# Patient Record
Sex: FEMALE | Race: OTHER | NOT HISPANIC OR LATINO
[De-identification: names, ages, dates, MRNs, and addresses within clinical notes are randomized per-mention and may not be internally consistent; named-entity substitution may affect disease eponyms.]

---

## 2019-04-18 ENCOUNTER — APPOINTMENT (OUTPATIENT)
Dept: OTOLARYNGOLOGY | Facility: CLINIC | Age: 74
End: 2019-04-18
Payer: MEDICARE

## 2019-04-18 ENCOUNTER — RESULT CHARGE (OUTPATIENT)
Age: 74
End: 2019-04-18

## 2019-04-18 VITALS — BODY MASS INDEX: 27.31 KG/M2 | WEIGHT: 160 LBS | HEIGHT: 64 IN

## 2019-04-18 DIAGNOSIS — H90.3 SENSORINEURAL HEARING LOSS, BILATERAL: ICD-10-CM

## 2019-04-18 DIAGNOSIS — Z78.9 OTHER SPECIFIED HEALTH STATUS: ICD-10-CM

## 2019-04-18 DIAGNOSIS — H90.5 UNSPECIFIED SENSORINEURAL HEARING LOSS: ICD-10-CM

## 2019-04-18 DIAGNOSIS — H61.20 IMPACTED CERUMEN, UNSPECIFIED EAR: ICD-10-CM

## 2019-04-18 DIAGNOSIS — Z86.2 PERSONAL HISTORY OF DISEASES OF THE BLOOD AND BLOOD-FORMING ORGANS AND CERTAIN DISORDERS INVOLVING THE IMMUNE MECHANISM: ICD-10-CM

## 2019-04-18 PROCEDURE — 69210 REMOVE IMPACTED EAR WAX UNI: CPT

## 2019-04-18 PROCEDURE — 92557 COMPREHENSIVE HEARING TEST: CPT

## 2019-04-18 PROCEDURE — 92567 TYMPANOMETRY: CPT

## 2019-04-18 PROCEDURE — 99203 OFFICE O/P NEW LOW 30 MIN: CPT | Mod: 25

## 2019-04-18 NOTE — PHYSICAL EXAM
[Midline] : trachea located in midline position [Normal] : orientation to person, place, and time: normal [FreeTextEntry1] : Microscopic ear exam with cerumen debridement:\par \par Right ear: Obstructing cerumen was debrided from the ear canal using suction, and curet.  The ear canal was otherwise within normal limits.  The tympanic membrane was intact and noninflamed.\par \par Left ear: Obstructing cerumen was debrided from the ear canal using suction, and curets.  The ear canal was otherwise within normal limits.  The tympanic membrane was intact and noninflamed.\par

## 2019-04-18 NOTE — HISTORY OF PRESENT ILLNESS
[de-identified] : GUERRERO ARAUJO has a history of recurrent right ear congestion and eczema.  No ear pain or otorrhea. Using a cotton swab to dry the ear occasionally. Some hearing loss reported.

## 2019-04-18 NOTE — REASON FOR VISIT
[Subsequent Evaluation] : a subsequent evaluation for [Hearing Loss] : hearing loss [FreeTextEntry2] : Ear Wax Cleaning

## 2019-04-18 NOTE — ASSESSMENT
[FreeTextEntry1] : Ear hygiene reviewed in detail.  Follow up recommended if symptoms persist or progresses.  Routine follow up for cerumen management suggested.\par Clinical monitoring recommended.

## 2019-04-19 PROBLEM — H90.3 SENSORINEURAL HEARING LOSS (SNHL) OF BOTH EARS: Status: ACTIVE | Noted: 2019-04-19

## 2019-05-02 ENCOUNTER — APPOINTMENT (OUTPATIENT)
Dept: OTOLARYNGOLOGY | Facility: CLINIC | Age: 74
End: 2019-05-02

## 2019-10-20 ENCOUNTER — INPATIENT (INPATIENT)
Facility: HOSPITAL | Age: 74
LOS: 0 days | Discharge: LEFT BEFORE DISCHARGE | DRG: 305 | End: 2019-10-21
Attending: INTERNAL MEDICINE | Admitting: INTERNAL MEDICINE
Payer: MEDICARE

## 2019-10-20 VITALS
WEIGHT: 160.06 LBS | OXYGEN SATURATION: 97 % | RESPIRATION RATE: 24 BRPM | TEMPERATURE: 97 F | SYSTOLIC BLOOD PRESSURE: 227 MMHG | DIASTOLIC BLOOD PRESSURE: 97 MMHG | HEART RATE: 63 BPM

## 2019-10-20 LAB
ALBUMIN SERPL ELPH-MCNC: 4.7 G/DL — SIGNIFICANT CHANGE UP (ref 3.3–5)
ALP SERPL-CCNC: 77 U/L — SIGNIFICANT CHANGE UP (ref 40–120)
ALT FLD-CCNC: 20 U/L — SIGNIFICANT CHANGE UP (ref 10–45)
ANION GAP SERPL CALC-SCNC: 17 MMOL/L — SIGNIFICANT CHANGE UP (ref 5–17)
APPEARANCE UR: CLEAR — SIGNIFICANT CHANGE UP
APTT BLD: 28.4 SEC — SIGNIFICANT CHANGE UP (ref 27.5–36.3)
AST SERPL-CCNC: 22 U/L — SIGNIFICANT CHANGE UP (ref 10–40)
BACTERIA # UR AUTO: PRESENT /HPF
BASOPHILS # BLD AUTO: 0.04 K/UL — SIGNIFICANT CHANGE UP (ref 0–0.2)
BASOPHILS NFR BLD AUTO: 0.2 % — SIGNIFICANT CHANGE UP (ref 0–2)
BILIRUB SERPL-MCNC: 0.6 MG/DL — SIGNIFICANT CHANGE UP (ref 0.2–1.2)
BILIRUB UR-MCNC: NEGATIVE — SIGNIFICANT CHANGE UP
BUN SERPL-MCNC: 19 MG/DL — SIGNIFICANT CHANGE UP (ref 7–23)
CALCIUM SERPL-MCNC: 9.4 MG/DL — SIGNIFICANT CHANGE UP (ref 8.4–10.5)
CHLORIDE SERPL-SCNC: 98 MMOL/L — SIGNIFICANT CHANGE UP (ref 96–108)
CK MB CFR SERPL CALC: 2.5 NG/ML — SIGNIFICANT CHANGE UP (ref 0–6.7)
CK SERPL-CCNC: 92 U/L — SIGNIFICANT CHANGE UP (ref 25–170)
CO2 SERPL-SCNC: 26 MMOL/L — SIGNIFICANT CHANGE UP (ref 22–31)
COLOR SPEC: YELLOW — SIGNIFICANT CHANGE UP
CREAT SERPL-MCNC: 0.56 MG/DL — SIGNIFICANT CHANGE UP (ref 0.5–1.3)
DIFF PNL FLD: ABNORMAL
EOSINOPHIL # BLD AUTO: 0.03 K/UL — SIGNIFICANT CHANGE UP (ref 0–0.5)
EOSINOPHIL NFR BLD AUTO: 0.2 % — SIGNIFICANT CHANGE UP (ref 0–6)
EPI CELLS # UR: SIGNIFICANT CHANGE UP /HPF (ref 0–5)
EXTRA SST TUBE: SIGNIFICANT CHANGE UP
GLUCOSE SERPL-MCNC: 282 MG/DL — HIGH (ref 70–99)
GLUCOSE UR QL: 250
HCT VFR BLD CALC: 45.5 % — HIGH (ref 34.5–45)
HGB BLD-MCNC: 14.9 G/DL — SIGNIFICANT CHANGE UP (ref 11.5–15.5)
IMM GRANULOCYTES NFR BLD AUTO: 0.9 % — SIGNIFICANT CHANGE UP (ref 0–1.5)
INR BLD: 1.02 — SIGNIFICANT CHANGE UP (ref 0.88–1.16)
KETONES UR-MCNC: 15 MG/DL
LEUKOCYTE ESTERASE UR-ACNC: NEGATIVE — SIGNIFICANT CHANGE UP
LIDOCAIN IGE QN: 16 U/L — SIGNIFICANT CHANGE UP (ref 7–60)
LYMPHOCYTES # BLD AUTO: 12.7 % — LOW (ref 13–44)
LYMPHOCYTES # BLD AUTO: 2.15 K/UL — SIGNIFICANT CHANGE UP (ref 1–3.3)
MCHC RBC-ENTMCNC: 30.3 PG — SIGNIFICANT CHANGE UP (ref 27–34)
MCHC RBC-ENTMCNC: 32.7 GM/DL — SIGNIFICANT CHANGE UP (ref 32–36)
MCV RBC AUTO: 92.7 FL — SIGNIFICANT CHANGE UP (ref 80–100)
MONOCYTES # BLD AUTO: 0.58 K/UL — SIGNIFICANT CHANGE UP (ref 0–0.9)
MONOCYTES NFR BLD AUTO: 3.4 % — SIGNIFICANT CHANGE UP (ref 2–14)
NEUTROPHILS # BLD AUTO: 14.02 K/UL — HIGH (ref 1.8–7.4)
NEUTROPHILS NFR BLD AUTO: 82.6 % — HIGH (ref 43–77)
NITRITE UR-MCNC: NEGATIVE — SIGNIFICANT CHANGE UP
NRBC # BLD: 0 /100 WBCS — SIGNIFICANT CHANGE UP (ref 0–0)
PH UR: 7 — SIGNIFICANT CHANGE UP (ref 5–8)
PLATELET # BLD AUTO: 227 K/UL — SIGNIFICANT CHANGE UP (ref 150–400)
POTASSIUM SERPL-MCNC: 3.3 MMOL/L — LOW (ref 3.5–5.3)
POTASSIUM SERPL-SCNC: 3.3 MMOL/L — LOW (ref 3.5–5.3)
PROT SERPL-MCNC: 7.8 G/DL — SIGNIFICANT CHANGE UP (ref 6–8.3)
PROT UR-MCNC: 100 MG/DL
PROTHROM AB SERPL-ACNC: 11.5 SEC — SIGNIFICANT CHANGE UP (ref 10–12.9)
RBC # BLD: 4.91 M/UL — SIGNIFICANT CHANGE UP (ref 3.8–5.2)
RBC # FLD: 12.5 % — SIGNIFICANT CHANGE UP (ref 10.3–14.5)
RBC CASTS # UR COMP ASSIST: < 5 /HPF — SIGNIFICANT CHANGE UP
SODIUM SERPL-SCNC: 141 MMOL/L — SIGNIFICANT CHANGE UP (ref 135–145)
SP GR SPEC: 1.02 — SIGNIFICANT CHANGE UP (ref 1–1.03)
TROPONIN T SERPL-MCNC: <0.01 NG/ML — SIGNIFICANT CHANGE UP (ref 0–0.01)
UROBILINOGEN FLD QL: 0.2 E.U./DL — SIGNIFICANT CHANGE UP
WBC # BLD: 16.98 K/UL — HIGH (ref 3.8–10.5)
WBC # FLD AUTO: 16.98 K/UL — HIGH (ref 3.8–10.5)
WBC UR QL: < 5 /HPF — SIGNIFICANT CHANGE UP

## 2019-10-20 PROCEDURE — 71045 X-RAY EXAM CHEST 1 VIEW: CPT | Mod: 26

## 2019-10-20 PROCEDURE — 99292 CRITICAL CARE ADDL 30 MIN: CPT | Mod: 25

## 2019-10-20 PROCEDURE — 99291 CRITICAL CARE FIRST HOUR: CPT

## 2019-10-20 PROCEDURE — ZZZZZ: CPT

## 2019-10-20 PROCEDURE — 93010 ELECTROCARDIOGRAM REPORT: CPT

## 2019-10-20 RX ORDER — FAMOTIDINE 10 MG/ML
20 INJECTION INTRAVENOUS ONCE
Refills: 0 | Status: COMPLETED | OUTPATIENT
Start: 2019-10-20 | End: 2019-10-20

## 2019-10-20 RX ORDER — DILTIAZEM HCL 120 MG
60 CAPSULE, EXT RELEASE 24 HR ORAL ONCE
Refills: 0 | Status: COMPLETED | OUTPATIENT
Start: 2019-10-20 | End: 2019-10-20

## 2019-10-20 RX ORDER — POTASSIUM CHLORIDE 20 MEQ
40 PACKET (EA) ORAL ONCE
Refills: 0 | Status: COMPLETED | OUTPATIENT
Start: 2019-10-20 | End: 2019-10-20

## 2019-10-20 RX ORDER — HYDRALAZINE HCL 50 MG
5 TABLET ORAL ONCE
Refills: 0 | Status: COMPLETED | OUTPATIENT
Start: 2019-10-20 | End: 2019-10-20

## 2019-10-20 RX ORDER — ONDANSETRON 8 MG/1
4 TABLET, FILM COATED ORAL ONCE
Refills: 0 | Status: COMPLETED | OUTPATIENT
Start: 2019-10-20 | End: 2019-10-20

## 2019-10-20 RX ORDER — HYDRALAZINE HCL 50 MG
10 TABLET ORAL ONCE
Refills: 0 | Status: COMPLETED | OUTPATIENT
Start: 2019-10-20 | End: 2019-10-20

## 2019-10-20 RX ORDER — LABETALOL HCL 100 MG
10 TABLET ORAL ONCE
Refills: 0 | Status: COMPLETED | OUTPATIENT
Start: 2019-10-20 | End: 2019-10-20

## 2019-10-20 RX ADMIN — Medication 40 MILLIEQUIVALENT(S): at 23:44

## 2019-10-20 RX ADMIN — ONDANSETRON 4 MILLIGRAM(S): 8 TABLET, FILM COATED ORAL at 19:48

## 2019-10-20 RX ADMIN — Medication 10 MILLIGRAM(S): at 18:32

## 2019-10-20 RX ADMIN — Medication 10 MILLIGRAM(S): at 20:58

## 2019-10-20 RX ADMIN — FAMOTIDINE 20 MILLIGRAM(S): 10 INJECTION INTRAVENOUS at 18:32

## 2019-10-20 RX ADMIN — Medication 5 MILLIGRAM(S): at 20:20

## 2019-10-20 RX ADMIN — Medication 5 MILLIGRAM(S): at 19:48

## 2019-10-20 RX ADMIN — ONDANSETRON 4 MILLIGRAM(S): 8 TABLET, FILM COATED ORAL at 18:32

## 2019-10-20 RX ADMIN — Medication 60 MILLIGRAM(S): at 19:46

## 2019-10-20 NOTE — ED ADULT NURSE NOTE - CHIEF COMPLAINT QUOTE
Patient c/o nausea and weakness since 230pm today while on the train. She states "The train was so hot it made me nauseous."

## 2019-10-20 NOTE — ED PROVIDER NOTE - DIAGNOSTIC INTERPRETATION
CXR: no focal consolidation, elevated right hemidiaphragm, normal bones, normal cardiac contour.  read by Dr. Mendez

## 2019-10-20 NOTE — ED PROVIDER NOTE - OBJECTIVE STATEMENT
here with nausea.  Was on the q train about 3 hours ago when symptoms started.  Felt similar to motion sickness she gets when flying on airplane.  Associated with feeling hot.  Denies headache, chest pain, sob, fever, abdominal pain, diarrhea.  Took half a dramamine without relief.  Still feels nauseous now.  Vomited a small amount.  Symptoms constant.

## 2019-10-20 NOTE — ED PROVIDER NOTE - PROGRESS NOTE DETAILS
bp remains elevated after labetalol 10mg.  nausea improved but still present.  hr now upper 50's so will give dose of hydralazine.  also po dose of diltiazem which she normally takes in evening. bp still elevated after hydralazine 5.  will redose bp still elevated after 5 hydralazine.  redosed at 10mg and now improved.  discussed with cards fellow.  will admit to tele

## 2019-10-20 NOTE — ED PROVIDER NOTE - CLINICAL SUMMARY MEDICAL DECISION MAKING FREE TEXT BOX
hypertensive emergency with ecg changes suggestive of inferior ischemia.  no chest pain or sob but having nausea.  see progress notes notes for frequent reassessments/ treatment of elevated bp with labetalol then hydralazine iv.  also given po dose of normal night cardizem.  labs with leukocytosis but no signs of infection/ fever.  no cough, dysuria, abdominal pain.  will observe but hold antibiotic pending further symptoms.  admit to tele for management of hypertensive emergency.

## 2019-10-20 NOTE — ED PROVIDER NOTE - CRITICAL CARE INDICATION, MLM
patient was critically ill... Patient was critically ill with a high probability of imminent or life threatening deterioration.  hypertensive emergency with ecg changes requiring frequent reassessment/ iv bp control..

## 2019-10-20 NOTE — ED PROVIDER NOTE - CHIEF COMPLAINT
She is a very pleasant woman here for assessment of her left ear. Last week she developed acute swelling and erythema of the tragus. She stopped utilizing the hearing aid in that ear and notes that her symptoms now have been more or less completely resolved for the past several days. She is not complaining of any ear drainage. She felt that the swelling was external to the tragus. Placed on antibiotics or drops. She is doing quite well. She has not had similar problems in the past.    Physical examination:  On physical examination she is very pleasant elderly woman in no distress. Facial nerve function was normal and symmetrical. On visual inspection the face was symmetric without scarring or lesions. The nose externally was normal. Intranasally unremarkable. Mucosa appeared healthy. The lips and gums were normal the tongue and soft palate mobility were normal. The posterior and lateral pharyngeal walls were normal. The auricles were normal and symmetrical. The right ear canal was normal as was the tympanic membrane. There was no perforation or effusion. On the left side there was no evidence of external otitis. The auricles normal as was the tragus. No lesions were visible. There is no induration, ulceration, swelling, or cutaneous irregularities. The external auditory canal was normal as was the tympanic membrane. There was no perforation or effusion.    Impression:  Resolved left external otitis likely brought on by use of her hearing aid.    Recommendation:  I have reassured her that everything appears healthy. I will see her back at this point on an as-needed basis.   The patient is a 73y Female complaining of nausea.

## 2019-10-20 NOTE — ED ADULT TRIAGE NOTE - OTHER COMPLAINTS
Patient hypertensive on arrival. She states she did not take her atenolol today. She denies dizziness, numbness, chest pain, sob, headache or blurry vision

## 2019-10-21 ENCOUNTER — TRANSCRIPTION ENCOUNTER (OUTPATIENT)
Age: 74
End: 2019-10-21

## 2019-10-21 ENCOUNTER — INBOUND DOCUMENT (OUTPATIENT)
Age: 74
End: 2019-10-21

## 2019-10-21 VITALS — TEMPERATURE: 100 F

## 2019-10-21 DIAGNOSIS — Z98.49 CATARACT EXTRACTION STATUS, UNSPECIFIED EYE: Chronic | ICD-10-CM

## 2019-10-21 DIAGNOSIS — I16.1 HYPERTENSIVE EMERGENCY: ICD-10-CM

## 2019-10-21 DIAGNOSIS — Z90.710 ACQUIRED ABSENCE OF BOTH CERVIX AND UTERUS: Chronic | ICD-10-CM

## 2019-10-21 DIAGNOSIS — E11.9 TYPE 2 DIABETES MELLITUS WITHOUT COMPLICATIONS: ICD-10-CM

## 2019-10-21 LAB
ALBUMIN SERPL ELPH-MCNC: 4.3 G/DL — SIGNIFICANT CHANGE UP (ref 3.3–5)
ALP SERPL-CCNC: 67 U/L — SIGNIFICANT CHANGE UP (ref 40–120)
ALT FLD-CCNC: 18 U/L — SIGNIFICANT CHANGE UP (ref 10–45)
ANION GAP SERPL CALC-SCNC: 14 MMOL/L — SIGNIFICANT CHANGE UP (ref 5–17)
APPEARANCE UR: CLEAR — SIGNIFICANT CHANGE UP
APTT BLD: 29.4 SEC — SIGNIFICANT CHANGE UP (ref 27.5–36.3)
AST SERPL-CCNC: 17 U/L — SIGNIFICANT CHANGE UP (ref 10–40)
BASOPHILS # BLD AUTO: 0.02 K/UL — SIGNIFICANT CHANGE UP (ref 0–0.2)
BASOPHILS NFR BLD AUTO: 0.2 % — SIGNIFICANT CHANGE UP (ref 0–2)
BILIRUB SERPL-MCNC: 0.7 MG/DL — SIGNIFICANT CHANGE UP (ref 0.2–1.2)
BILIRUB UR-MCNC: NEGATIVE — SIGNIFICANT CHANGE UP
BUN SERPL-MCNC: 21 MG/DL — SIGNIFICANT CHANGE UP (ref 7–23)
CALCIUM SERPL-MCNC: 9.4 MG/DL — SIGNIFICANT CHANGE UP (ref 8.4–10.5)
CHLORIDE SERPL-SCNC: 99 MMOL/L — SIGNIFICANT CHANGE UP (ref 96–108)
CHOLEST SERPL-MCNC: 190 MG/DL — SIGNIFICANT CHANGE UP (ref 10–199)
CK MB CFR SERPL CALC: 2.3 NG/ML — SIGNIFICANT CHANGE UP (ref 0–6.7)
CK SERPL-CCNC: 80 U/L — SIGNIFICANT CHANGE UP (ref 25–170)
CO2 SERPL-SCNC: 27 MMOL/L — SIGNIFICANT CHANGE UP (ref 22–31)
COLOR SPEC: YELLOW — SIGNIFICANT CHANGE UP
CREAT SERPL-MCNC: 0.73 MG/DL — SIGNIFICANT CHANGE UP (ref 0.5–1.3)
DIFF PNL FLD: ABNORMAL
EOSINOPHIL # BLD AUTO: 0 K/UL — SIGNIFICANT CHANGE UP (ref 0–0.5)
EOSINOPHIL NFR BLD AUTO: 0 % — SIGNIFICANT CHANGE UP (ref 0–6)
GLUCOSE BLDC GLUCOMTR-MCNC: 103 MG/DL — HIGH (ref 70–99)
GLUCOSE BLDC GLUCOMTR-MCNC: 136 MG/DL — HIGH (ref 70–99)
GLUCOSE SERPL-MCNC: 139 MG/DL — HIGH (ref 70–99)
GLUCOSE UR QL: 250
HBA1C BLD-MCNC: 6.2 % — HIGH (ref 4–5.6)
HCT VFR BLD CALC: 42.3 % — SIGNIFICANT CHANGE UP (ref 34.5–45)
HDLC SERPL-MCNC: 66 MG/DL — SIGNIFICANT CHANGE UP
HGB BLD-MCNC: 14.1 G/DL — SIGNIFICANT CHANGE UP (ref 11.5–15.5)
IMM GRANULOCYTES NFR BLD AUTO: 0.5 % — SIGNIFICANT CHANGE UP (ref 0–1.5)
INR BLD: 1.07 — SIGNIFICANT CHANGE UP (ref 0.88–1.16)
KETONES UR-MCNC: NEGATIVE — SIGNIFICANT CHANGE UP
LEUKOCYTE ESTERASE UR-ACNC: NEGATIVE — SIGNIFICANT CHANGE UP
LIPID PNL WITH DIRECT LDL SERPL: 106 MG/DL — HIGH
LYMPHOCYTES # BLD AUTO: 1.47 K/UL — SIGNIFICANT CHANGE UP (ref 1–3.3)
LYMPHOCYTES # BLD AUTO: 11.3 % — LOW (ref 13–44)
MAGNESIUM SERPL-MCNC: 2.4 MG/DL — SIGNIFICANT CHANGE UP (ref 1.6–2.6)
MCHC RBC-ENTMCNC: 30.8 PG — SIGNIFICANT CHANGE UP (ref 27–34)
MCHC RBC-ENTMCNC: 33.3 GM/DL — SIGNIFICANT CHANGE UP (ref 32–36)
MCV RBC AUTO: 92.4 FL — SIGNIFICANT CHANGE UP (ref 80–100)
MONOCYTES # BLD AUTO: 0.64 K/UL — SIGNIFICANT CHANGE UP (ref 0–0.9)
MONOCYTES NFR BLD AUTO: 4.9 % — SIGNIFICANT CHANGE UP (ref 2–14)
NEUTROPHILS # BLD AUTO: 10.84 K/UL — HIGH (ref 1.8–7.4)
NEUTROPHILS NFR BLD AUTO: 83.1 % — HIGH (ref 43–77)
NITRITE UR-MCNC: NEGATIVE — SIGNIFICANT CHANGE UP
NRBC # BLD: 0 /100 WBCS — SIGNIFICANT CHANGE UP (ref 0–0)
PH UR: 7 — SIGNIFICANT CHANGE UP (ref 5–8)
PLATELET # BLD AUTO: 226 K/UL — SIGNIFICANT CHANGE UP (ref 150–400)
POTASSIUM SERPL-MCNC: 4.9 MMOL/L — SIGNIFICANT CHANGE UP (ref 3.5–5.3)
POTASSIUM SERPL-SCNC: 4.9 MMOL/L — SIGNIFICANT CHANGE UP (ref 3.5–5.3)
PROT SERPL-MCNC: 7.3 G/DL — SIGNIFICANT CHANGE UP (ref 6–8.3)
PROT UR-MCNC: 30 MG/DL
PROTHROM AB SERPL-ACNC: 12.1 SEC — SIGNIFICANT CHANGE UP (ref 10–12.9)
RBC # BLD: 4.58 M/UL — SIGNIFICANT CHANGE UP (ref 3.8–5.2)
RBC # FLD: 13.1 % — SIGNIFICANT CHANGE UP (ref 10.3–14.5)
SODIUM SERPL-SCNC: 140 MMOL/L — SIGNIFICANT CHANGE UP (ref 135–145)
SP GR SPEC: 1.01 — SIGNIFICANT CHANGE UP (ref 1–1.03)
TOTAL CHOLESTEROL/HDL RATIO MEASUREMENT: 2.9 RATIO — LOW (ref 3.3–7.1)
TRIGL SERPL-MCNC: 92 MG/DL — SIGNIFICANT CHANGE UP (ref 10–149)
TROPONIN T SERPL-MCNC: <0.01 NG/ML — SIGNIFICANT CHANGE UP (ref 0–0.01)
UROBILINOGEN FLD QL: 0.2 E.U./DL — SIGNIFICANT CHANGE UP
WBC # BLD: 13.04 K/UL — HIGH (ref 3.8–10.5)
WBC # FLD AUTO: 13.04 K/UL — HIGH (ref 3.8–10.5)

## 2019-10-21 PROCEDURE — 93306 TTE W/DOPPLER COMPLETE: CPT | Mod: 26

## 2019-10-21 PROCEDURE — ZZZZZ: CPT

## 2019-10-21 RX ORDER — HEPARIN SODIUM 5000 [USP'U]/ML
5000 INJECTION INTRAVENOUS; SUBCUTANEOUS EVERY 8 HOURS
Refills: 0 | Status: DISCONTINUED | OUTPATIENT
Start: 2019-10-21 | End: 2019-10-21

## 2019-10-21 RX ORDER — DEXTROSE 50 % IN WATER 50 %
12.5 SYRINGE (ML) INTRAVENOUS ONCE
Refills: 0 | Status: DISCONTINUED | OUTPATIENT
Start: 2019-10-21 | End: 2019-10-21

## 2019-10-21 RX ORDER — DEXTROSE 50 % IN WATER 50 %
15 SYRINGE (ML) INTRAVENOUS ONCE
Refills: 0 | Status: DISCONTINUED | OUTPATIENT
Start: 2019-10-21 | End: 2019-10-21

## 2019-10-21 RX ORDER — DEXTROSE 50 % IN WATER 50 %
25 SYRINGE (ML) INTRAVENOUS ONCE
Refills: 0 | Status: DISCONTINUED | OUTPATIENT
Start: 2019-10-21 | End: 2019-10-21

## 2019-10-21 RX ORDER — SODIUM CHLORIDE 9 MG/ML
1000 INJECTION, SOLUTION INTRAVENOUS
Refills: 0 | Status: DISCONTINUED | OUTPATIENT
Start: 2019-10-21 | End: 2019-10-21

## 2019-10-21 RX ORDER — ISOSORBIDE DINITRATE 5 MG/1
1 TABLET ORAL
Qty: 90 | Refills: 0
Start: 2019-10-21 | End: 2019-11-19

## 2019-10-21 RX ORDER — ATENOLOL 25 MG/1
25 TABLET ORAL DAILY
Refills: 0 | Status: DISCONTINUED | OUTPATIENT
Start: 2019-10-21 | End: 2019-10-21

## 2019-10-21 RX ORDER — INFLUENZA VIRUS VACCINE 15; 15; 15; 15 UG/.5ML; UG/.5ML; UG/.5ML; UG/.5ML
0.5 SUSPENSION INTRAMUSCULAR ONCE
Refills: 0 | Status: COMPLETED | OUTPATIENT
Start: 2019-10-21 | End: 2019-10-21

## 2019-10-21 RX ORDER — INSULIN LISPRO 100/ML
VIAL (ML) SUBCUTANEOUS
Refills: 0 | Status: DISCONTINUED | OUTPATIENT
Start: 2019-10-21 | End: 2019-10-21

## 2019-10-21 RX ORDER — GLUCAGON INJECTION, SOLUTION 0.5 MG/.1ML
1 INJECTION, SOLUTION SUBCUTANEOUS ONCE
Refills: 0 | Status: DISCONTINUED | OUTPATIENT
Start: 2019-10-21 | End: 2019-10-21

## 2019-10-21 RX ORDER — DILTIAZEM HCL 120 MG
30 CAPSULE, EXT RELEASE 24 HR ORAL THREE TIMES A DAY
Refills: 0 | Status: DISCONTINUED | OUTPATIENT
Start: 2019-10-21 | End: 2019-10-21

## 2019-10-21 RX ORDER — ISOSORBIDE DINITRATE 5 MG/1
10 TABLET ORAL THREE TIMES A DAY
Refills: 0 | Status: DISCONTINUED | OUTPATIENT
Start: 2019-10-21 | End: 2019-10-21

## 2019-10-21 RX ADMIN — Medication 30 MILLIGRAM(S): at 13:56

## 2019-10-21 RX ADMIN — ATENOLOL 25 MILLIGRAM(S): 25 TABLET ORAL at 13:56

## 2019-10-21 RX ADMIN — Medication 30 MILLIGRAM(S): at 05:55

## 2019-10-21 NOTE — PATIENT PROFILE ADULT - VISION (WITH CORRECTIVE LENSES IF THE PATIENT USUALLY WEARS THEM):
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 Unable to read labels/newsprint in right eye./Partially impaired: cannot see medication labels or newsprint, but can see obstacles in path, and the surrounding layout; can count fingers at arm's length

## 2019-10-21 NOTE — PATIENT PROFILE ADULT - NSPROMEDSHERBAL_GEN_A_NUR
Magy Askew #[id number] (CSN: [id number])  (89 year old F)  (Adm: 17)     [id number]               [place] [address] [facility]: [phone]            Patient Demographics     Patient Name Sex          Age SSN Address Phone    Magy Askew Female 10/11/1925 (89 year old)  [address]  [facility] [address] [phone] (Home)       yes

## 2019-10-21 NOTE — ED ADULT NURSE REASSESSMENT NOTE - NS ED NURSE REASSESS COMMENT FT1
Patient ambulated to the restroom to void. Assisted patient to the restroom. Patient able to tolerate ambulation independently.

## 2019-10-21 NOTE — H&P ADULT - ASSESSMENT
74 y/o female, non compliant with mediation, and PMHx of uncontrolled HTN, DM2 (pt. was on Metformin and stopped taking; blood glucose 282 on this admission; f/u HgbA1C) BIBA this evening, 10/20/19, complaining of diaphoresis, "flushed" feeling with nausea and two episodes of nbnb.  Pt. was noted to have BP of 230/100.  Pt. admitted with HTN Emergency.

## 2019-10-21 NOTE — DISCHARGE NOTE PROVIDER - NSDCFUADDINST_GEN_ALL_CORE_FT
If you develop chest pain, dizziness, blurry vision, headache, slurred speech, shortness of breath please report the nearest ED.

## 2019-10-21 NOTE — DISCHARGE NOTE PROVIDER - HOSPITAL COURSE
74 y/o female, non-compliant with mediation, and PMHx of uncontrolled HTN, DM2 (currently not on medication) presented to [facility] ED (10/2./2019) complaining of diaphoresis, "flushed" feeling with nausea and two episodes of nbnb.  Pt. was noted to have BP of 230/100 requiring Labetolol 10mg IV X1, Hydralazine 5mg IV X2, Hydralazine 10mg IV X1 and Cardizem 60 mg orally x one dose. Patient was admitted to [facility] for further management of hypertension urgency. Patient’s troponin is negative x2 and   Echo (10/21/2019) revealed Normal left and right ventricular size and systolic function, Grade I left ventricular diastolic dysfunction without elevated filling pressure. Moderately dilated left atrium. Mild aortic regurgitation. Mild pulmonary hypertension, PASP is 44.1 mmHg. Patient was recommended for CTA Coronaries but is refusing at this time and is requesting outpatient work up. BP trend has improved with current SBP range of 117//65 mmHG. Per Dr. Gordon patient to continue home Atenolol 25 mg daily and Cardizem 30 mg orally three times a day. In addition, patient recommended for Endocrine consult as Hemoglobin A1c 6.2% but patient declined consultation at this time. Patient follows up with Dr. Ron Yuan and has been instructed to follow up within one week of discharge for a BP Check up. Patient remains chest pain free and reports she feels better. No events have been noted on telemetry at this time. Lab values reviewed as well; WBC noted to be 13.04 but remains afebrile and asymptomatic. Patient has been cleared for discharge per Dr. Gordon and has been instructed to follow up with Dr. Yuan within one week of discharge for a check up. In addition, patient requesting follow up with Dr. Gordon and contact information provided. 72 y/o female, non-compliant with mediation, and PMHx of uncontrolled HTN, DM2 (currently not on medication) presented to [facility] ED (10/2./2019) complaining of diaphoresis, "flushed" feeling with nausea and two episodes of nbnb.  Pt. was noted to have BP of 230/100 requiring Labetolol 10mg IV X1, Hydralazine 5mg IV X2, Hydralazine 10mg IV X1 and Cardizem 60 mg orally x one dose. Patient was admitted to [facility] for further management of hypertension urgency. Patient’s troponin is negative x2 and   Echo (10/21/2019) revealed Normal left and right ventricular size and systolic function, Grade I left ventricular diastolic dysfunction without elevated filling pressure. Moderately dilated left atrium. Mild aortic regurgitation. Mild pulmonary hypertension, PASP is 44.1 mmHg. Patient was recommended for CTA Coronaries but is refusing at this time and is requesting outpatient work up. BP trend has been reviewed and currently remains uncontrolled with most repeat /81 mmHG. Patient recommended to start taking Isordil 10 mg orally three times a day in addition to her home Cardizem 30 mg orally every 8 hours and Atenolol 25 mg daily. Patient is currently refusing addition of any other BP agents stating her current regimen works for her. Discussed at length with patient the risk of uncontrolled BP including heart attack, stroke and damage to her kidneys. Patient is adamant she has to leave and wants to sign out AMA.  In addition, patient recommended for Endocrine consult as Hemoglobin A1c 6.2% but patient declined consultation at this time. Patient follows up with Dr. Ron Yuan and has been instructed to follow up within one week of discharge for a BP Check up. Lab values reviewed as well; WBC noted to be 13.04 but remains afebrile and asymptomatic. Pt has been instructed to follow up with Dr. Yuan ASA for a BP check up and further work up. In addition, patient requesting follow up with Dr. Gordon and contact information provided. Patient is not cleared for discharge at this time and is signing out Against Medical Advice. 72 y/o female, non-compliant with mediation, and PMHx of uncontrolled HTN, DM2 (currently not on medication) presented to [facility] ED (10/2./2019) complaining of diaphoresis, "flushed" feeling with nausea and two episodes of nbnb.  Pt. was noted to have BP of 230/100 requiring Labetolol 10mg IV X1, Hydralazine 5mg IV X2, Hydralazine 10mg IV X1 and Cardizem 60 mg orally x one dose. Patient was admitted to [facility] for further management of hypertension urgency. Patient’s troponin is negative x2 and   Echo (10/21/2019) revealed Normal left and right ventricular size and systolic function, Grade I left ventricular diastolic dysfunction without elevated filling pressure. Moderately dilated left atrium. Mild aortic regurgitation. Mild pulmonary hypertension, PASP is 44.1 mmHg. Patient was recommended for CTA Coronaries but is refusing at this time and is requesting outpatient work up. BP trend has been reviewed and currently remains uncontrolled with most repeat /81 mmHG. Patient recommended to start taking Isordil 10 mg orally three times a day in addition to her home Cardizem 30 mg orally every 8 hours and Atenolol 25 mg daily. Patient is currently refusing addition of any other BP agents stating her current regimen works for her. Discussed at length with patient the risk of uncontrolled BP including heart attack, stroke and damage to her kidneys. Patient is adamant she has to leave and wants to sign out AMA.  In addition, patient recommended for Endocrine consult as Hemoglobin A1c 6.2% but patient declined consultation at this time. Patient follows up with Dr. Ron Yuan and has been instructed to follow up within one week of discharge for a BP Check up. Lab values reviewed as well; WBC noted to be 13.04 but remains afebrile and asymptomatic. Pt has been instructed to follow up with Dr. Yuan ASA for a BP check up and further work up. In addition, patient requesting follow up with Dr. Gordon and contact information provided. Patient is not cleared for discharge at this time and is signing out Against Medical Advice. Per Dr. Gordon script given for Isordil 10 mg orally three times a day.

## 2019-10-21 NOTE — DISCHARGE NOTE PROVIDER - PROVIDER TOKENS
PROVIDER:[TOKEN:[[id number]],FOLLOWUP:[1 week]],FREE:[LAST:[Pecker],FIRST:[Ron],PHONE:[phone]],FAX:[(   )    -],ADDRESS:[Address: [address] [place] [address]  Phone: [phone]],FOLLOWUP:[1-3 days]]

## 2019-10-21 NOTE — H&P ADULT - NSHPREVIEWOFSYSTEMS_GEN_ALL_CORE
GENERAL, CONSTITUTIONAL : denies recent weight loss, fever, chills  EYES, VISION: denies changes in vision   EARS, NOSE, THROAT: denies hearing loss  HEART, CARDIOVASCULAR: denies chest pain, arrhythmia, palpitations,   RESPIRATORY: Denies cough, SOB, wheezing, PND, orthopnea  GASTROINTESTINAL:  Admits to nausea and one episode of nbnb emesis ; Denies abdominal pain, heartburn, bloody stool, dark tarry stool  GENITOURINARY: Denies frequent urination, urgency  MUSCULOSKELETAL denies joint pain or swelling, restricted motion, musculoskeletal pain.   SKIN & INTEGUMENTARY Denies rashes, sores, blisters, blisters, growths.  NEUROLOGICAL: admits to lightheadedness;  Denies numbness or tingling sensations, sensation loss, burning.   PSYCHIATRIC: Denies nervousness, anxiety, depression  ENDOCRINE  admits to flushed feeling, Denies heat or cold intolerance, excessive thirst  HEMATOLOGIC/LYMPHATIC: Denies abnormal bleeding, bleeding of any kind

## 2019-10-21 NOTE — H&P ADULT - PROBLEM SELECTOR PLAN 2
Monitor F/U Hgb A1C   Blood Glucose 282 on admission.  Pt. was on Metformin in which she stopped taking.  Insulin Lispro Med sliding scale for now ..  Endocrine consult in AM.  Call.

## 2019-10-21 NOTE — DISCHARGE NOTE PROVIDER - NSDCCPCAREPLAN_GEN_ALL_CORE_FT
PRINCIPAL DISCHARGE DIAGNOSIS  Diagnosis: Uncontrolled hypertension  Assessment and Plan of Treatment: You were admitted to the hospital due to your uncontrolled blood pressure. On arrival to the Emergency Room your blood pressure was extremely elevated requiring IV blood pressure medication to lower it.   We do not recommend you leave the hospital as your blood pressure is not controlled.   Please continue taking your Atenolol 25 mg daily and Cardizem 30 mg orally three times a day.   Dr. Gordon would like you start taking Isordil 10 mg orally three times a day as well to control your blood pressure.   If you develop chest pain, dizziness, blurry vision, headache, slurred speech, shortness of breath please report the nearest ED.  Please follow up with your physician Dr. Ron RODRIGEZ within 1-2 days of discharge for a blood pressure check up. Your cardiologist while you were admitted here with us was Dr. Gordon.   In addition, Dr. Gordon recommends a Cat Scan of your Coronary arteries to rule out any blockages in your heart and you should have this arranged at your outpatient follow up.         SECONDARY DISCHARGE DIAGNOSES  Diagnosis: Diabetes  Assessment and Plan of Treatment: PRINCIPAL DISCHARGE DIAGNOSIS  Diagnosis: Uncontrolled hypertension  Assessment and Plan of Treatment: You were admitted to the hospital due to your uncontrolled blood pressure. On arrival to the Emergency Room your blood pressure was extremely elevated requiring IV blood pressure medication to lower it.   We do not recommend you leave the hospital as your blood pressure is not controlled.   Please continue taking your Atenolol 25 mg daily and Cardizem 30 mg orally three times a day.   Dr. Gordon would like you start taking Isordil 10 mg orally three times a day as well to control your blood pressure.   If you develop chest pain, dizziness, blurry vision, headache, slurred speech, shortness of breath please report the nearest ED.  Please follow up with your physician Dr. Ron RODRIGEZ within 1-2 days of discharge for a blood pressure check up. Your cardiologist while you were admitted here with us was Dr. Gordon.   In addition, Dr. Gordon recommends a Cat Scan of your Coronary arteries to rule out any blockages in your heart and you should have this arranged at your outpatient follow up.         SECONDARY DISCHARGE DIAGNOSES  Diagnosis: Diabetes  Assessment and Plan of Treatment: Please follow up with your Endocrinologist as scheduled.   Your Hemoglobin A1c is 6.2%.   An A1C level between 5.7 and 6.4 percent is considered prediabetes. Please follow up with your Endocrinoligst or PCP

## 2019-10-21 NOTE — H&P ADULT - HISTORY OF PRESENT ILLNESS
74 y/o female, non compliant with mediation, and PMHx of uncontrolled HTN, DM2 (pt. was on Metformin and stopped taking; blood glucose 282 on this admission; f/u HgbA1C) BIBA this evening, 10/20/19, complaining of diaphoresis, "flushed" feeling with nausea and two episodes of nbnb.  Pt. was noted to have BP of 230/100.  Pt. admitted with HTN Emergency.      According to patient, this morning she was on the [employer] train  on her way to [place] to meet her friend for cha, when suddenly she felt "hot/flushed" with diaphoresis and nausea.  Pt. states symptoms were similar to motion sickness she gets when flying on airplane and took half of dramamine without relief.  When she arrived at her destination, she got off train and started to feel lightheaded.  She met her friend at restaurant and had one episode of nbnb emesis with worsening diaphoresis.  EMS was called and noted pt.'s BP to be 200/100.  When pt. arrived at [facility] ER /100 was given labetolol 10mg IV X1 with no change, Hydralazine 5mg IV X2 and Hydralazine 10mg IV X1 decreasing . 72 y/o female, non compliant with mediation, and PMHx of uncontrolled HTN, DM2 (pt. was on Metformin and stopped taking; blood glucose 282 on this admission; f/u HgbA1C) BIBA this evening, 10/20/19, complaining of diaphoresis, "flushed" feeling with nausea and two episodes of nbnb.  Pt. was noted to have BP of 230/100.  Pt. admitted with HTN Emergency.      According to patient, this morning she was on the "[employer] train  on her way to [place] to meet her friend for cha, when suddenly she felt "hot/flushed" with diaphoresis and nausea.  Pt. states symptoms were similar to motion sickness she gets when flying on airplane and took half of dramamine without relief.  When she arrived at her destination, she got off train and started to feel lightheaded.  She met her friend at restaurant and had one episode of nbnb emesis with worsening diaphoresis.  EMS was called and noted pt.'s BP to be 200/100.  When pt. arrived at [facility] ER /100 was given labetolol 10mg IV X1 with no change, Hydralazine 5mg IV X2 and Hydralazine 10mg IV X1 decreasing .  Pt. was given her home dose of Cardizem 60mg PO X1.  Pt. admits to having uncontrolled BP and states her Cardiologist was going to try Torsemide with her.   Pt. reports intolerance to HCTZ and Chlorthalidone.    Pt. denies recent URI, HA, chest pain, palpitations, abdominal discomfort or bowel change.    In ER ECG reveals NSR@68bpm with non specific ST-T wave changes, Troponin neg X1, WBC 16.9, Neutrophil 82.6, pt. afebrile, K+3.3 (supplemented with Klor 40), Blood Glucose 282 and CXR no acute pathology seen.   UA reveals bacteria and blood Glucose 250.  F/U Hgb A1C.  Pt. had another episode of nbnb emesis and was given Zofran 4mg IV X1.   Pt. admitted to [facility] with HTN Emergency, continue telemetry, ECG, serial CE, Echocardiogram and NPO for further cardiac workup.  Discuss with Dr. Gordon in AM.

## 2019-10-21 NOTE — H&P ADULT - NSHPPHYSICALEXAM_GEN_ALL_CORE
T(C): 36.9 (10-21-19 @ 02:04), Max: 36.9 (10-21-19 @ 02:04)  HR: 75 (10-21-19 @ 00:46) (58 - 89)  BP: 140/63 (10-21-19 @ 00:46) (140/63 - 230/100)  RR: 18 (10-21-19 @ 00:46) (18 - 24)  SpO2: 94% (10-21-19 @ 00:46) (84% - 98%)  Wt(kg): --    Appearance: Normal	  HEENT:   Normal oral mucosa, PERRL, EOMI	  Neck: Supple, - JVD; No Carotid Bruit and 2+ pulses B/L  Cardiovascular: Normal S1 S2, No JVD, No murmurs  Respiratory: Lungs clear to auscultation//No Rales, Rhonchi, Wheezing	  Gastrointestinal:  Soft, Non-tender, + BS	  Skin: No rashes, No ecchymoses, No cyanosis  Extremities: Normal range of motion, No clubbing, cyanosis or edema  Vascular: Femoral pulses 2+ b/l without bruit, DP 2+ b/l, PT 1+ b/l  Neurologic: Non-focal  Psychiatry: A & O x 3, Mood & affect appropriate

## 2019-10-21 NOTE — H&P ADULT - PROBLEM SELECTOR PLAN 1
Monitor BP.  Hx of uncontrol BP.  Home medication Atenolol 25mg PO daily and Cardizem 30mg PO tid.     Discuss change in Medication.

## 2019-10-21 NOTE — DISCHARGE NOTE NURSING/CASE MANAGEMENT/SOCIAL WORK - PATIENT PORTAL LINK FT
You can access the FollowMyHealth Patient Portal offered by [facility] by registering at the following website: [email or web address]. By joining [facility]’s FollowMyHealth portal, you will also be able to view your health information using other applications (apps) compatible with our system.

## 2019-10-24 DIAGNOSIS — I16.1 HYPERTENSIVE EMERGENCY: ICD-10-CM

## 2019-10-24 DIAGNOSIS — Z91.14 PATIENT'S OTHER NONCOMPLIANCE WITH MEDICATION REGIMEN: ICD-10-CM

## 2019-10-24 DIAGNOSIS — E11.9 TYPE 2 DIABETES MELLITUS WITHOUT COMPLICATIONS: ICD-10-CM

## 2019-10-24 DIAGNOSIS — I10 ESSENTIAL (PRIMARY) HYPERTENSION: ICD-10-CM

## 2019-11-12 PROCEDURE — 80053 COMPREHEN METABOLIC PANEL: CPT

## 2019-11-12 PROCEDURE — 83690 ASSAY OF LIPASE: CPT

## 2019-11-12 PROCEDURE — 85730 THROMBOPLASTIN TIME PARTIAL: CPT

## 2019-11-12 PROCEDURE — 96374 THER/PROPH/DIAG INJ IV PUSH: CPT

## 2019-11-12 PROCEDURE — 99291 CRITICAL CARE FIRST HOUR: CPT | Mod: 25

## 2019-11-12 PROCEDURE — 71045 X-RAY EXAM CHEST 1 VIEW: CPT

## 2019-11-12 PROCEDURE — 82962 GLUCOSE BLOOD TEST: CPT

## 2019-11-12 PROCEDURE — 93005 ELECTROCARDIOGRAM TRACING: CPT

## 2019-11-12 PROCEDURE — 82550 ASSAY OF CK (CPK): CPT

## 2019-11-12 PROCEDURE — 80061 LIPID PANEL: CPT

## 2019-11-12 PROCEDURE — 83036 HEMOGLOBIN GLYCOSYLATED A1C: CPT

## 2019-11-12 PROCEDURE — 82248 BILIRUBIN DIRECT: CPT

## 2019-11-12 PROCEDURE — 96375 TX/PRO/DX INJ NEW DRUG ADDON: CPT

## 2019-11-12 PROCEDURE — 83735 ASSAY OF MAGNESIUM: CPT

## 2019-11-12 PROCEDURE — G0378: CPT

## 2019-11-12 PROCEDURE — 36415 COLL VENOUS BLD VENIPUNCTURE: CPT

## 2019-11-12 PROCEDURE — 93306 TTE W/DOPPLER COMPLETE: CPT

## 2019-11-12 PROCEDURE — 81001 URINALYSIS AUTO W/SCOPE: CPT

## 2019-11-12 PROCEDURE — 99292 CRITICAL CARE ADDL 30 MIN: CPT | Mod: 25

## 2019-11-12 PROCEDURE — 85025 COMPLETE CBC W/AUTO DIFF WBC: CPT

## 2019-11-12 PROCEDURE — 84484 ASSAY OF TROPONIN QUANT: CPT

## 2019-11-12 PROCEDURE — 82553 CREATINE MB FRACTION: CPT

## 2019-11-12 PROCEDURE — 96376 TX/PRO/DX INJ SAME DRUG ADON: CPT

## 2019-11-12 PROCEDURE — 85610 PROTHROMBIN TIME: CPT

## 2019-12-03 PROBLEM — R73.9 HYPERGLYCEMIA, UNSPECIFIED: Chronic | Status: ACTIVE | Noted: 2019-10-20

## 2019-12-03 PROBLEM — E11.9 TYPE 2 DIABETES MELLITUS WITHOUT COMPLICATIONS: Chronic | Status: ACTIVE | Noted: 2019-10-21

## 2019-12-03 PROBLEM — I10 ESSENTIAL (PRIMARY) HYPERTENSION: Chronic | Status: ACTIVE | Noted: 2019-10-20

## 2019-12-16 ENCOUNTER — APPOINTMENT (OUTPATIENT)
Dept: HEART AND VASCULAR | Facility: CLINIC | Age: 74
End: 2019-12-16
Payer: MEDICARE

## 2019-12-16 VITALS
HEART RATE: 79 BPM | BODY MASS INDEX: 27.31 KG/M2 | SYSTOLIC BLOOD PRESSURE: 210 MMHG | DIASTOLIC BLOOD PRESSURE: 105 MMHG | OXYGEN SATURATION: 96 % | HEIGHT: 64 IN | TEMPERATURE: 98.2 F | WEIGHT: 159.99 LBS

## 2019-12-16 PROCEDURE — 99358 PROLONG SERVICE W/O CONTACT: CPT

## 2019-12-16 PROCEDURE — 93000 ELECTROCARDIOGRAM COMPLETE: CPT

## 2019-12-16 PROCEDURE — 99212 OFFICE O/P EST SF 10 MIN: CPT

## 2019-12-16 RX ORDER — ISOSORBIDE MONONITRATE 30 MG/1
30 TABLET, EXTENDED RELEASE ORAL DAILY
Qty: 90 | Refills: 3 | Status: ACTIVE | COMMUNITY
Start: 2019-12-16 | End: 1900-01-01

## 2019-12-16 RX ORDER — ATENOLOL 25 MG/1
25 TABLET ORAL
Refills: 0 | Status: ACTIVE | COMMUNITY

## 2019-12-16 NOTE — HISTORY OF PRESENT ILLNESS
[FreeTextEntry1] : 72 y/o female, non-compliant with mediation, and PMHx of uncontrolled HTN, DM2 \par (currently not on medication) presented to [facility] ED (10/2./2019) complaining of \par diaphoresis, "flushed" feeling with nausea and two episodes of nbnb.  Pt. was \par noted to have BP of 230/100 requiring Labetolol 10mg IV X1, Hydralazine 5mg IV \par X2, Hydralazine 10mg IV X1 and Cardizem 60 mg orally x one dose. Patient was \par admitted to [facility] for further management of hypertension urgency. Patients \par troponin is negative x2 and Echo (10/21/2019) revealed Normal left and right \par ventricular size and systolic function, Grade I left ventricular diastolic \par dysfunction without elevated filling pressure. Moderately dilated left atrium. \par Mild aortic regurgitation. Mild pulmonary hypertension, PASP is 44.1 mmHg. \par Patient was recommended for CTA Coronaries but is refusing at this time and is \par requesting outpatient work up. BP trend has been reviewed and currently remains \par uncontrolled with most repeat /81 mmHG. Patient recommended to start \par taking Isordil 10 mg orally three times a day in addition to her home Cardizem \par 30 mg orally every 8 hours and Atenolol 25 mg daily. The patient left AM from the hospital\par Interval Symptoms: usual state of health, 100% compliant with meds. \par pnd - no\par orthopnea - no\par leg edema - no\par syncope - no\par dizziness - no\par palpitations - no\par leg pain - no\par SOB - no\par chest pain - no\par \par location: chest\par duration: none\par  modifying factors: none\par timing: none\par severity: 0/10\par \par EKG unchanged from prior (in chart)\par consultation notes reviewed where appropriate\par \par Medications: the patient is adherent with his medication regimen. denies medication side effects. \par Disease Management: the patient is doing well with goals. \par \par

## 2019-12-16 NOTE — DISCUSSION/SUMMARY
[FreeTextEntry1] : The number of diagnostic and/or management options include:\par \par HTN - NOT at goal, avides pt to return to ED pt resudef compus mentus risk including death d/w pt in detail, pt not taking isordil and rx, will rx imdur 30mg and f/u 2 weeks, ecg very abnormal strain with anteroseptal infarct \par  Echo (10/21/2019) revealed Normal left and right \par ventricular size and systolic function, Grade I left ventricular diastolic \par dysfunction without elevated filling pressure. Moderately dilated left atrium. \par Mild aortic regurgitation. Mild pulmonary hypertension, PASP is 44.1 mmHg. \par  CTA Coronaries but is refusing \par \par CAD - the patient is not in active chest pain, no new sob or ross, ekg is unchanged and stable. on guideline directed medical therapy. will obtain CTA cor\par \par \par CV Prevention - \par ·	Advised SBP guidelines based on ACC/AHA and SPRINT trial increased CAD PAD and mortality \par ·	Assessed willingness to attempt - contemplation stage\par ·	Agree to no added salt and added sugar diet  - prevention medicine referral, nutritionist\par ·	Assist with resources provided - prevention medicine referral, nutritionist, individual counseling\par ·	Arrange ·	Follow-up arranged - next scheduled visit\par \par Prescription drug management: no change\par Review of medicine test: EKG\par Independent review of images: CXR, CT Scan, Stress tests, as applicable\par Review of clinical lab tests: lipid profile, hgA1c, cbc, bnp, metabolic panels, as applicable\par \par 34 min of non-face-to-face prolonged service provided in relation to the complexity of the patient's HTN EMERGENCY that relates to (face-to-face) care that has or will occur and ongoing patient management, including review of clinical lab tests, review of radiology tests, review of medication, and review and summarization of recent hospital inpatient admission, progress and discharge records to assist in this exam. \par  \par \par

## 2019-12-16 NOTE — REASON FOR VISIT
[Follow-Up - From Hospitalization] : follow-up of a recent hospitalization for [Hypertension] : hypertension [Discharge Date: ___] : Discharge Date: [unfilled]

## 2019-12-30 ENCOUNTER — APPOINTMENT (OUTPATIENT)
Dept: HEART AND VASCULAR | Facility: CLINIC | Age: 74
End: 2019-12-30
Payer: MEDICARE

## 2019-12-30 ENCOUNTER — NON-APPOINTMENT (OUTPATIENT)
Age: 74
End: 2019-12-30

## 2019-12-30 VITALS
SYSTOLIC BLOOD PRESSURE: 180 MMHG | WEIGHT: 160 LBS | BODY MASS INDEX: 27.31 KG/M2 | HEART RATE: 72 BPM | HEIGHT: 64 IN | DIASTOLIC BLOOD PRESSURE: 96 MMHG | OXYGEN SATURATION: 96 %

## 2019-12-30 PROCEDURE — 99213 OFFICE O/P EST LOW 20 MIN: CPT

## 2019-12-30 NOTE — DISCUSSION/SUMMARY
[Coronary Artery Disease] : coronary artery disease [Coronary Angiography] : coronary angiography [Increase] : increasing calcium channel blockers [Possible Angioplasty] : possible angioplasty [Weight Reduction] : weight reduction [Hypertension] : hypertension [Ambulatory BP Monitoring] : ambulatory blood pressure monitoring [Not Responding to Treatment] : not responding to treatment [ECG] : ECG [Electron Beam CT] : electron beam CT [Exercise Regimen] : an exercise regimen [Weight Loss] : weight loss [Deteriorating] : deteriorating [Heart Failure Diastolic] : diastolic heart failure [Echocardiogram] : an echocardiogram [Sodium Restriction] : sodium restriction [Electrocardiogram] : an ECG [Continue] : continuing beta blockers [Low Sodium Diet] : low sodium diet

## 2019-12-30 NOTE — HISTORY OF PRESENT ILLNESS
[FreeTextEntry1] : 74 y/o female, non-compliant with mediation, and PMHx of uncontrolled HTN, DM2 \par (currently not on medication) presented to [facility] ED (10/2./2019) complaining of \par diaphoresis, "flushed" feeling with nausea and two episodes of nbnb.  Pt. was \par noted to have BP of 230/100 requiring Labetolol 10mg IV X1, Hydralazine 5mg IV \par X2, Hydralazine 10mg IV X1 and Cardizem 60 mg orally x one dose. Patient was \par admitted to [facility] for further management of hypertension urgency. Patients \par troponin is negative x2 and Echo (10/21/2019) revealed Normal left and right \par ventricular size and systolic function, Grade I left ventricular diastolic \par dysfunction without elevated filling pressure. Moderately dilated left atrium. \par Mild aortic regurgitation. Mild pulmonary hypertension, PASP is 44.1 mmHg. \par Patient was recommended for CTA Coronaries but is refusing at this time and is \par requesting outpatient work up. BP trend has been reviewed and currently remains \par uncontrolled with most repeat /81 mmHG. Patient recommended to start \par taking Isordil 10 mg orally three times a day in addition to her home Cardizem \par 30 mg orally every 8 hours and Atenolol 25 mg daily. The patient left AM from the hospital\par Interval Symptoms: usual state of health, 100% compliant with meds. \par pnd - no\par orthopnea - no\par leg edema - no\par syncope - no\par dizziness - no\par palpitations - no\par leg pain - no\par SOB - no\par chest pain - no\par \par location: chest\par duration: none\par  modifying factors: none\par timing: none\par severity: 0/10\par \par EKG unchanged from prior (in chart)\par consultation notes reviewed where appropriate\par \par Medications: the patient is adherent with his medication regimen. denies medication side effects. \par Disease Management: the patient is doing well with goals. \par \par  12/30/19\par Interval Symptoms: EKG today unchanged from prior (in chart)\par usual state of health, no new complaints\par SOB - no\par chest pain - no\par location: chest\par duration: none\par modifying factors: none\par timing: none\par severity: 0/10\par Medications: the patient is adherent with his medication regimen. denies medication side effects. \par Disease Management: the patient is doing well with goals.

## 2019-12-30 NOTE — PHYSICAL EXAM
[General Appearance - Well Developed] : well developed [Well Groomed] : well groomed [Normal Appearance] : normal appearance [General Appearance - Well Nourished] : well nourished [No Deformities] : no deformities [Normal Conjunctiva] : the conjunctiva exhibited no abnormalities [General Appearance - In No Acute Distress] : no acute distress [Eyelids - No Xanthelasma] : the eyelids demonstrated no xanthelasmas [Normal Oral Mucosa] : normal oral mucosa [No Oral Cyanosis] : no oral cyanosis [No Oral Pallor] : no oral pallor [Normal Jugular Venous A Waves Present] : normal jugular venous A waves present [Normal Jugular Venous V Waves Present] : normal jugular venous V waves present [No Jugular Venous Mcnamara A Waves] : no jugular venous mcnamara A waves [Respiration, Rhythm And Depth] : normal respiratory rhythm and effort [Exaggerated Use Of Accessory Muscles For Inspiration] : no accessory muscle use [Heart Rate And Rhythm] : heart rate and rhythm were normal [Auscultation Breath Sounds / Voice Sounds] : lungs were clear to auscultation bilaterally [Heart Sounds] : normal S1 and S2 [Abdomen Tenderness] : non-tender [Murmurs] : no murmurs present [Abdomen Soft] : soft [Abdomen Mass (___ Cm)] : no abdominal mass palpated [Abnormal Walk] : normal gait [Gait - Sufficient For Exercise Testing] : the gait was sufficient for exercise testing [Nail Clubbing] : no clubbing of the fingernails [Cyanosis, Localized] : no localized cyanosis [Petechial Hemorrhages (___cm)] : no petechial hemorrhages [Skin Color & Pigmentation] : normal skin color and pigmentation [] : no rash [No Venous Stasis] : no venous stasis [Skin Lesions] : no skin lesions [No Skin Ulcers] : no skin ulcer [Oriented To Time, Place, And Person] : oriented to person, place, and time [No Xanthoma] : no  xanthoma was observed [Mood] : the mood was normal [Affect] : the affect was normal [No Anxiety] : not feeling anxious

## 2020-02-24 ENCOUNTER — APPOINTMENT (OUTPATIENT)
Dept: HEART AND VASCULAR | Facility: CLINIC | Age: 75
End: 2020-02-24
Payer: MEDICARE

## 2020-02-24 VITALS
HEIGHT: 64 IN | TEMPERATURE: 98 F | SYSTOLIC BLOOD PRESSURE: 224 MMHG | BODY MASS INDEX: 27.31 KG/M2 | DIASTOLIC BLOOD PRESSURE: 110 MMHG | WEIGHT: 159.99 LBS | HEART RATE: 72 BPM | OXYGEN SATURATION: 98 %

## 2020-02-24 DIAGNOSIS — I10 ESSENTIAL (PRIMARY) HYPERTENSION: ICD-10-CM

## 2020-02-24 DIAGNOSIS — Z00.00 ENCOUNTER FOR GENERAL ADULT MEDICAL EXAMINATION W/OUT ABNORMAL FINDINGS: ICD-10-CM

## 2020-02-24 DIAGNOSIS — R06.02 SHORTNESS OF BREATH: ICD-10-CM

## 2020-02-24 DIAGNOSIS — I25.10 ATHEROSCLEROTIC HEART DISEASE OF NATIVE CORONARY ARTERY W/OUT ANGINA PECTORIS: ICD-10-CM

## 2020-02-24 DIAGNOSIS — Z86.79 PERSONAL HISTORY OF OTHER DISEASES OF THE CIRCULATORY SYSTEM: ICD-10-CM

## 2020-02-24 PROCEDURE — 93000 ELECTROCARDIOGRAM COMPLETE: CPT

## 2020-02-24 PROCEDURE — 99214 OFFICE O/P EST MOD 30 MIN: CPT

## 2020-02-24 NOTE — PHYSICAL EXAM
[Normal Appearance] : normal appearance [General Appearance - Well Developed] : well developed [Well Groomed] : well groomed [General Appearance - Well Nourished] : well nourished [No Deformities] : no deformities [Normal Conjunctiva] : the conjunctiva exhibited no abnormalities [General Appearance - In No Acute Distress] : no acute distress [Eyelids - No Xanthelasma] : the eyelids demonstrated no xanthelasmas [No Oral Pallor] : no oral pallor [Normal Oral Mucosa] : normal oral mucosa [No Oral Cyanosis] : no oral cyanosis [Normal Jugular Venous A Waves Present] : normal jugular venous A waves present [Respiration, Rhythm And Depth] : normal respiratory rhythm and effort [Normal Jugular Venous V Waves Present] : normal jugular venous V waves present [No Jugular Venous Mcnamara A Waves] : no jugular venous mcnamara A waves [Auscultation Breath Sounds / Voice Sounds] : lungs were clear to auscultation bilaterally [Exaggerated Use Of Accessory Muscles For Inspiration] : no accessory muscle use [Heart Sounds] : normal S1 and S2 [Heart Rate And Rhythm] : heart rate and rhythm were normal [Abdomen Soft] : soft [Murmurs] : no murmurs present [Abdomen Tenderness] : non-tender [Abdomen Mass (___ Cm)] : no abdominal mass palpated [Gait - Sufficient For Exercise Testing] : the gait was sufficient for exercise testing [Abnormal Walk] : normal gait [Nail Clubbing] : no clubbing of the fingernails [Cyanosis, Localized] : no localized cyanosis [Petechial Hemorrhages (___cm)] : no petechial hemorrhages [Skin Color & Pigmentation] : normal skin color and pigmentation [] : no rash [No Venous Stasis] : no venous stasis [No Skin Ulcers] : no skin ulcer [Skin Lesions] : no skin lesions [Oriented To Time, Place, And Person] : oriented to person, place, and time [No Xanthoma] : no  xanthoma was observed [Affect] : the affect was normal [Mood] : the mood was normal [No Anxiety] : not feeling anxious

## 2020-02-24 NOTE — HISTORY OF PRESENT ILLNESS
[FreeTextEntry1] : 72 y/o female, non-compliant with mediation, and PMHx of uncontrolled HTN, DM2 \par (currently not on medication) presented to [facility] ED (10/2./2019) complaining of \par diaphoresis, "flushed" feeling with nausea and two episodes of nbnb.  Pt. was \par noted to have BP of 230/100 requiring Labetolol 10mg IV X1, Hydralazine 5mg IV \par X2, Hydralazine 10mg IV X1 and Cardizem 60 mg orally x one dose. Patient was \par admitted to [facility] for further management of hypertension urgency. Patients \par troponin is negative x2 and Echo (10/21/2019) revealed Normal left and right \par ventricular size and systolic function, Grade I left ventricular diastolic \par dysfunction without elevated filling pressure. Moderately dilated left atrium. \par Mild aortic regurgitation. Mild pulmonary hypertension, PASP is 44.1 mmHg. \par Patient was recommended for CTA Coronaries but is refusing at this time and is \par requesting outpatient work up. BP trend has been reviewed and currently remains \par uncontrolled with most repeat /81 mmHG. Patient recommended to start \par taking Isordil 10 mg orally three times a day in addition to her home Cardizem \par 30 mg orally every 8 hours and Atenolol 25 mg daily. The patient left AM from the hospital\par Interval Symptoms: usual state of health, 100% compliant with meds. \par pnd - no\par orthopnea - no\par leg edema - no\par syncope - no\par dizziness - no\par palpitations - no\par leg pain - no\par SOB - no\par chest pain - no\par \par location: chest\par duration: none\par  modifying factors: none\par timing: none\par severity: 0/10\par \par EKG unchanged from prior (in chart)\par consultation notes reviewed where appropriate\par \par Medications: the patient is adherent with his medication regimen. denies medication side effects. \par Disease Management: the patient is doing well with goals. \par \par  12/30/19\par Interval Symptoms: EKG today unchanged from prior (in chart)\par usual state of health, no new complaints\par SOB - no\par chest pain - no\par location: chest\par duration: none\par modifying factors: none\par timing: none\par severity: 0/10\par Medications: the patient is adherent with his medication regimen. denies medication side effects. \par Disease Management: the patient is doing well with goals. \par \par 2/24/20\par EKG performed today reviewed (in chart)\par usual state of health, no new complaints\par SOB - no\par chest pain - no\par location: chest\par duration: none\par modifying factors: none\par timing: none\par severity: 0/10\par Medications: the patient is adherent with his medication regimen. denies medication side effects. \par Disease Management: the patient is doing well with goals.

## 2020-02-24 NOTE — DISCUSSION/SUMMARY
[Coronary Artery Disease] : coronary artery disease [Left Ventricular Hypertrophy] : left ventricular hypertrophy [PTCA] : PTCA [Anginal Equivalent] : anginal equivalent [Coronary Artery Catheterization] : coronary artery catheterization [Angioplasty] : angioplasty [Possible Stent Placement] : possible stent placement [Deteriorating] : deteriorating [Hypertension] : hypertension [Not Responding to Treatment] : not responding to treatment [Outpatient Evaluation] : outpatient evaluation [ECG] : ECG [Ambulatory BP Monitoring] : ambulatory blood pressure monitoring [Cardiac Catheterization] : cardiac catheterization [Medication Changes Per Orders] : as documented in orders [Coronary Angiography] : coronary angiography

## 2020-02-25 RX ORDER — HYDRALAZINE HYDROCHLORIDE 25 MG/1
25 TABLET ORAL TWICE DAILY
Qty: 180 | Refills: 3 | Status: ACTIVE | COMMUNITY
Start: 2020-02-25 | End: 1900-01-01

## 2020-03-27 RX ORDER — DILTIAZEM HYDROCHLORIDE 30 MG/1
30 TABLET ORAL 4 TIMES DAILY
Qty: 120 | Refills: 3 | Status: ACTIVE | COMMUNITY
Start: 1900-01-01 | End: 1900-01-01

## 2020-06-11 NOTE — ED ADULT NURSE NOTE - CCCP TRG CHIEF CMPLNT
New Patient Consult      Date: 2020   Patient Name: Beena Burris  MRN: [id number]  : 1953     Referring Physician: Melissa Connor MD    Chief Complaint   Patient presents with   • Colon Cancer Screening       History of Present Illness: Beena Burris is a 67 y.o. female who is here today to establish care with Gastroenterology for colon cancer screening..     This patient deny any abdominal pain, change in bowel habit, hematochezia or melena.  Weight is stable. Pt denies nausea vomiting or odynophagia or dysphagia. There is no history of acid reflux. She had PUD 20 years ago and she is on pepcid daily. There is no history of anemia. Prior history of EGD in 2016. Last colonoscopy more than 10 years ago and was normal as per pt. No family history of colon cancer or any GI malignancy. No history of any abdominal surgery. Denies alcohol abuse or cigarette smoking.   She does have a prior history of CBD stone and had a MRCP and ERCP in the past in 2016.  She also had a laparoscopic cholecystectomy for gallstones.   She is also on methadone for TM joint pain    Subjective      Past Medical History:   Past Medical History:   Diagnosis Date   • Disease of thyroid gland    • Hard to intubate    • Hypertension        Past Surgical History:   Past Surgical History:   Procedure Laterality Date   • APPENDECTOMY     • CHOLECYSTECTOMY N/A 2016    Procedure: CHOLECYSTECTOMY LAPAROSCOPIC;  Surgeon: Ross Carrillo MD;  Location:  [facility];  Service:    • LA ERCP DX COLLECTION SPECIMEN BRUSHING/WASHING N/A 2016    Procedure: ENDOSCOPIC RETROGRADE CHOLANGIOPANCREATOGRAPHY;  Surgeon: Clifford Telles DO;  Location: [facility] ENDOSCOPY;  Service: Gastroenterology   • TEMPOROMANDIBULAR JOINT (TMJ) ARTHROPLASTY      8   • TONSILLECTOMY         Family History:   Family History   Problem Relation Age of Onset   • Breast cancer Maternal Aunt        Social History:   Social History      Socioeconomic History   • Marital status:      Spouse name: Not on file   • Number of children: Not on file   • Years of education: Not on file   • Highest education level: Not on file   Tobacco Use   • Smoking status: Never Smoker   • Smokeless tobacco: Never Used   Substance and Sexual Activity   • Alcohol use: No   • Drug use: No   • Sexual activity: Defer     Partners: Male         Current Outpatient Medications:   •  amLODIPine-benazepril (LOTREL) 5-40 MG per capsule, Take 1 capsule by mouth Daily., Disp: , Rfl:   •  atorvastatin (LIPITOR) 40 MG tablet, Take 40 mg by mouth Daily., Disp: , Rfl:   •  carvedilol (COREG) 25 MG tablet, Take 25 mg by mouth 2 (Two) Times a Day With Meals., Disp: , Rfl:   •  famotidine (PEPCID) 20 MG tablet, Take 20 mg by mouth 2 (Two) Times a Day., Disp: , Rfl:   •  irbesartan (AVAPRO) 300 MG tablet, Take 300 mg by mouth Every Night., Disp: , Rfl:   •  levothyroxine (SYNTHROID, LEVOTHROID) 75 MCG tablet, Take 75 mcg by mouth Daily., Disp: , Rfl:   •  methadone (DOLOPHINE) 10 MG tablet, Take 10 mg by mouth 5 (Five) Times a Day,, Disp: , Rfl:   •  Mirabegron ER 50 MG tablet sustained-release 24 hour, Take 1 mg by mouth Daily., Disp: , Rfl:   •  oxyCODONE-acetaminophen (PERCOCET) 5-325 MG per tablet, Take 1 tablet by mouth Daily As Needed (for breakthrough pain)., Disp: , Rfl:   •  potassium chloride (K-DUR,KLOR-CON) 20 MEQ CR tablet, Take 20 mEq by mouth Daily., Disp: , Rfl:   •  sennosides-docusate sodium (SENOKOT-S) 8.6-50 MG tablet, Take 1 tablet by mouth As Needed for constipation., Disp: , Rfl:   •  traZODone (DESYREL) 100 MG tablet, Take 100 mg by mouth Every Night., Disp: , Rfl:   •  zolpidem CR (AMBIEN CR) 12.5 MG CR tablet, Take 12.5 mg by mouth At Night As Needed for sleep., Disp: , Rfl:   •  bisacodyl (DULCOLAX) 5 MG EC tablet, Take 4 tablets by mouth 1 (One) Time for 1 dose. Please see prep instructions from office., Disp: 4 tablet, Rfl: 0  •  polyethylene glycol  "(GoLYTELY) 236 g solution, Take 4,000 mL by mouth 1 (One) Time for 1 dose. Please see prep instructions from office., Disp: 4000 mL, Rfl: 0    No Known Allergies    Review of Systems:   Review of Systems   Constitutional: Negative for appetite change, fatigue, fever and unexpected weight loss.   Respiratory: Negative for cough, shortness of breath and wheezing.    Cardiovascular: Negative for chest pain, palpitations and leg swelling.   Gastrointestinal: Negative for abdominal distention, abdominal pain, anal bleeding, blood in stool, constipation, diarrhea, nausea, rectal pain, vomiting, GERD and indigestion.   Genitourinary: Negative for dysuria, frequency and hematuria.   Musculoskeletal: Negative for back pain and joint swelling.   Skin: Negative for color change, rash and skin lesions.   Neurological: Negative for dizziness, syncope, speech difficulty, weakness, headache and memory problem.   Hematological: Negative for adenopathy. Does not bruise/bleed easily.   Psychiatric/Behavioral: Negative for agitation, behavioral problems, suicidal ideas and depressed mood.       The following portions of the patient's history were reviewed and updated as appropriate: allergies, current medications, past family history, past medical history, past social history, past surgical history and problem list.    Objective     Physical Exam:  Vital Signs:   Vitals:    06/11/20 1024   BP: 142/80   Pulse: 78   Resp: 18   Temp: 96.8 °F (36 °C)   TempSrc: Temporal   SpO2: 98%   Weight: 75.8 kg (167 lb)   Height: 167.6 cm (66\")       Physical Exam   Constitutional: She is oriented to person, place, and time. She appears well-developed and well-nourished.   HENT:   Head: Normocephalic and atraumatic.   Right Ear: External ear normal.   Left Ear: External ear normal.   Mouth/Throat: Oropharynx is clear and moist.   Eyes: Pupils are equal, round, and reactive to light. Conjunctivae and EOM are normal.   Neck: Normal range of motion. No " tracheal deviation present. No thyromegaly present.   Cardiovascular: Normal rate and regular rhythm.   No murmur heard.  Pulmonary/Chest: Effort normal and breath sounds normal. No respiratory distress.   Abdominal: Soft. Bowel sounds are normal. She exhibits no mass. No hernia.   Musculoskeletal: Normal range of motion. She exhibits no edema.   Neurological: She is alert and oriented to person, place, and time. No cranial nerve deficit or sensory deficit.   Skin: Skin is warm and dry.   Psychiatric: She has a normal mood and affect. Her behavior is normal. Judgment and thought content normal.   Nursing note and vitals reviewed.      Results Review:   I have reviewed the patient's new clinical and imaging results and agree with the interpretation.     No visits with results within 90 Day(s) from this visit.   Latest known visit with results is:   Office Visit on 01/29/2020   Component Date Value Ref Range Status   • Color 01/29/2020 Yellow  Yellow, Straw, Dark Yellow, Fransisca Final   • Clarity, UA 01/29/2020 Clear  Clear Final   • Specific Gravity  01/29/2020 1.015  1.005 - 1.030 Final   • pH, Urine 01/29/2020 6.0  5.0 - 8.0 Final   • Leukocytes 01/29/2020 Negative  Negative Final   • Nitrite, UA 01/29/2020 Negative  Negative Final   • Protein, POC 01/29/2020 Negative  Negative mg/dL Final   • Glucose, UA 01/29/2020 Negative  Negative, 1000 mg/dL (3+) mg/dL Final   • Ketones, UA 01/29/2020 Negative  Negative Final   • Urobilinogen, UA 01/29/2020 Normal  Normal Final   • Bilirubin 01/29/2020 Negative  Negative Final   • Blood, UA 01/29/2020 Negative  Negative Final      No radiology results for the last 90 days.    Assessment / Plan      Assessment & Plan:  1. Encounter for screening colonoscopy for non-high-risk patient  She does not have any current GI symptoms.   She is average risk.  He had a colonoscopy done more 10 years ago and was been told normal.  Last hemoglobin was 14.7 g/dL done in August 2019  She is due  for screening colonoscopy and will schedule the same    The indications, technique, alternatives and potential risk and complications were discussed with the patient including but not limited to bleeding, bowel perforations, missing lesions and anesthetic complications. The patient understands and wishes to proceed with the procedure and has given their verbal consent. Written patient education information was given to the patient.   The patient will call if they have further questions before procedure.     - Case Request; Standing  - Implement Anesthesia Orders Day of Procedure; Standing  - Obtain Informed Consent; Standing  - Verify Bowel Prep Was Successful; Standing  - Oxygen Therapy- Nasal Cannula; 2 LPM; Titrate for SPO2: equal to or greater than, 90%; Standing  - sodium chloride 0.9 % infusion  - Case Request    2. Drug-induced constipation  She is on a methadone low-dose for her TM joint pain.  She gets constipation with that and she takes Senokot with a good bowel movement.   We will continue the same      Follow Up:   Return for Follow Up after procedure.    Jo Ann Orantes MD  [facility]  6/11/2020  11:05    Please note that portions of this note may have been completed with a voice recognition program. Efforts were made to edit the dictations, but occasionally words are mistranscribed.              nausea/hypertension

## 2021-12-14 ENCOUNTER — EMERGENCY (EMERGENCY)
Facility: HOSPITAL | Age: 76
LOS: 1 days | Discharge: HOME | End: 2021-12-14
Admitting: EMERGENCY MEDICINE
Payer: MEDICARE

## 2021-12-14 VITALS
HEIGHT: 64 IN | RESPIRATION RATE: 18 BRPM | WEIGHT: 160.06 LBS | SYSTOLIC BLOOD PRESSURE: 237 MMHG | OXYGEN SATURATION: 93 % | TEMPERATURE: 97 F | HEART RATE: 71 BPM | DIASTOLIC BLOOD PRESSURE: 124 MMHG

## 2021-12-14 DIAGNOSIS — E04.2 NONTOXIC MULTINODULAR GOITER: ICD-10-CM

## 2021-12-14 DIAGNOSIS — R11.2 NAUSEA WITH VOMITING, UNSPECIFIED: ICD-10-CM

## 2021-12-14 DIAGNOSIS — E87.6 HYPOKALEMIA: ICD-10-CM

## 2021-12-14 DIAGNOSIS — Z98.49 CATARACT EXTRACTION STATUS, UNSPECIFIED EYE: Chronic | ICD-10-CM

## 2021-12-14 DIAGNOSIS — I10 ESSENTIAL (PRIMARY) HYPERTENSION: ICD-10-CM

## 2021-12-14 DIAGNOSIS — E11.65 TYPE 2 DIABETES MELLITUS WITH HYPERGLYCEMIA: ICD-10-CM

## 2021-12-14 DIAGNOSIS — I16.0 HYPERTENSIVE URGENCY: ICD-10-CM

## 2021-12-14 DIAGNOSIS — Z20.822 CONTACT WITH AND (SUSPECTED) EXPOSURE TO COVID-19: ICD-10-CM

## 2021-12-14 DIAGNOSIS — Z88.8 ALLERGY STATUS TO OTHER DRUGS, MEDICAMENTS AND BIOLOGICAL SUBSTANCES: ICD-10-CM

## 2021-12-14 DIAGNOSIS — Z90.710 ACQUIRED ABSENCE OF BOTH CERVIX AND UTERUS: Chronic | ICD-10-CM

## 2021-12-14 LAB
ALBUMIN SERPL ELPH-MCNC: 4.2 G/DL — SIGNIFICANT CHANGE UP (ref 3.4–5)
ALP SERPL-CCNC: 92 U/L — SIGNIFICANT CHANGE UP (ref 40–120)
ALT FLD-CCNC: 27 U/L — SIGNIFICANT CHANGE UP (ref 12–42)
ANION GAP SERPL CALC-SCNC: 11 MMOL/L — SIGNIFICANT CHANGE UP (ref 9–16)
APPEARANCE UR: CLEAR — SIGNIFICANT CHANGE UP
AST SERPL-CCNC: 21 U/L — SIGNIFICANT CHANGE UP (ref 15–37)
BASOPHILS # BLD AUTO: 0.05 K/UL — SIGNIFICANT CHANGE UP (ref 0–0.2)
BASOPHILS NFR BLD AUTO: 0.5 % — SIGNIFICANT CHANGE UP (ref 0–2)
BILIRUB SERPL-MCNC: 0.6 MG/DL — SIGNIFICANT CHANGE UP (ref 0.2–1.2)
BILIRUB UR-MCNC: NEGATIVE — SIGNIFICANT CHANGE UP
BUN SERPL-MCNC: 23 MG/DL — SIGNIFICANT CHANGE UP (ref 7–23)
CALCIUM SERPL-MCNC: 9.4 MG/DL — SIGNIFICANT CHANGE UP (ref 8.5–10.5)
CHLORIDE SERPL-SCNC: 99 MMOL/L — SIGNIFICANT CHANGE UP (ref 96–108)
CO2 SERPL-SCNC: 30 MMOL/L — SIGNIFICANT CHANGE UP (ref 22–31)
COLOR SPEC: YELLOW — SIGNIFICANT CHANGE UP
CREAT SERPL-MCNC: 0.88 MG/DL — SIGNIFICANT CHANGE UP (ref 0.5–1.3)
DIFF PNL FLD: ABNORMAL
EOSINOPHIL # BLD AUTO: 0.13 K/UL — SIGNIFICANT CHANGE UP (ref 0–0.5)
EOSINOPHIL NFR BLD AUTO: 1.2 % — SIGNIFICANT CHANGE UP (ref 0–6)
GLUCOSE SERPL-MCNC: 229 MG/DL — HIGH (ref 70–99)
GLUCOSE UR QL: NEGATIVE — SIGNIFICANT CHANGE UP
HCT VFR BLD CALC: 46.3 % — HIGH (ref 34.5–45)
HGB BLD-MCNC: 15.6 G/DL — HIGH (ref 11.5–15.5)
IMM GRANULOCYTES NFR BLD AUTO: 0.8 % — SIGNIFICANT CHANGE UP (ref 0–1.5)
KETONES UR-MCNC: NEGATIVE — SIGNIFICANT CHANGE UP
LACTATE SERPL-SCNC: 1.7 MMOL/L — SIGNIFICANT CHANGE UP (ref 0.4–2)
LEUKOCYTE ESTERASE UR-ACNC: NEGATIVE — SIGNIFICANT CHANGE UP
LIDOCAIN IGE QN: 87 U/L — SIGNIFICANT CHANGE UP (ref 73–393)
LYMPHOCYTES # BLD AUTO: 3.64 K/UL — HIGH (ref 1–3.3)
LYMPHOCYTES # BLD AUTO: 33.3 % — SIGNIFICANT CHANGE UP (ref 13–44)
MAGNESIUM SERPL-MCNC: 2.5 MG/DL — SIGNIFICANT CHANGE UP (ref 1.6–2.6)
MCHC RBC-ENTMCNC: 30.8 PG — SIGNIFICANT CHANGE UP (ref 27–34)
MCHC RBC-ENTMCNC: 33.7 GM/DL — SIGNIFICANT CHANGE UP (ref 32–36)
MCV RBC AUTO: 91.5 FL — SIGNIFICANT CHANGE UP (ref 80–100)
MONOCYTES # BLD AUTO: 0.45 K/UL — SIGNIFICANT CHANGE UP (ref 0–0.9)
MONOCYTES NFR BLD AUTO: 4.1 % — SIGNIFICANT CHANGE UP (ref 2–14)
NEUTROPHILS # BLD AUTO: 6.56 K/UL — SIGNIFICANT CHANGE UP (ref 1.8–7.4)
NEUTROPHILS NFR BLD AUTO: 60.1 % — SIGNIFICANT CHANGE UP (ref 43–77)
NITRITE UR-MCNC: NEGATIVE — SIGNIFICANT CHANGE UP
NRBC # BLD: 0 /100 WBCS — SIGNIFICANT CHANGE UP (ref 0–0)
PCO2 BLDV: 52 MMHG — HIGH (ref 39–42)
PCP SPEC-MCNC: SIGNIFICANT CHANGE UP
PH BLDV: 7.43 — SIGNIFICANT CHANGE UP (ref 7.32–7.43)
PH UR: 8 — SIGNIFICANT CHANGE UP (ref 5–8)
PLATELET # BLD AUTO: 238 K/UL — SIGNIFICANT CHANGE UP (ref 150–400)
PO2 BLDV: 45 MMHG — SIGNIFICANT CHANGE UP (ref 25–45)
POTASSIUM SERPL-MCNC: 2.8 MMOL/L — CRITICAL LOW (ref 3.5–5.3)
POTASSIUM SERPL-SCNC: 2.8 MMOL/L — CRITICAL LOW (ref 3.5–5.3)
PROT SERPL-MCNC: 8.2 G/DL — SIGNIFICANT CHANGE UP (ref 6.4–8.2)
PROT UR-MCNC: 100 MG/DL
RBC # BLD: 5.06 M/UL — SIGNIFICANT CHANGE UP (ref 3.8–5.2)
RBC # FLD: 12.4 % — SIGNIFICANT CHANGE UP (ref 10.3–14.5)
SAO2 % BLDV: 75.3 % — SIGNIFICANT CHANGE UP (ref 67–88)
SARS-COV-2 RNA SPEC QL NAA+PROBE: SIGNIFICANT CHANGE UP
SODIUM SERPL-SCNC: 140 MMOL/L — SIGNIFICANT CHANGE UP (ref 132–145)
SP GR SPEC: 1.02 — SIGNIFICANT CHANGE UP (ref 1–1.03)
TROPONIN I, HIGH SENSITIVITY RESULT: 6.2 NG/L — SIGNIFICANT CHANGE UP
UROBILINOGEN FLD QL: 0.2 E.U./DL — SIGNIFICANT CHANGE UP
WBC # BLD: 10.92 K/UL — HIGH (ref 3.8–10.5)
WBC # FLD AUTO: 10.92 K/UL — HIGH (ref 3.8–10.5)

## 2021-12-14 PROCEDURE — 99285 EMERGENCY DEPT VISIT HI MDM: CPT

## 2021-12-14 PROCEDURE — 93010 ELECTROCARDIOGRAM REPORT: CPT

## 2021-12-14 PROCEDURE — 70498 CT ANGIOGRAPHY NECK: CPT | Mod: 26

## 2021-12-14 PROCEDURE — 70450 CT HEAD/BRAIN W/O DYE: CPT | Mod: 26,59

## 2021-12-14 PROCEDURE — 70496 CT ANGIOGRAPHY HEAD: CPT | Mod: 26

## 2021-12-14 RX ORDER — METOCLOPRAMIDE HCL 10 MG
10 TABLET ORAL ONCE
Refills: 0 | Status: COMPLETED | OUTPATIENT
Start: 2021-12-14 | End: 2021-12-14

## 2021-12-14 RX ORDER — DEXTROSE MONOHYDRATE, SODIUM CHLORIDE, AND POTASSIUM CHLORIDE 50; .745; 4.5 G/1000ML; G/1000ML; G/1000ML
1000 INJECTION, SOLUTION INTRAVENOUS
Refills: 0 | Status: COMPLETED | OUTPATIENT
Start: 2021-12-14 | End: 2021-12-15

## 2021-12-14 RX ORDER — ONDANSETRON 8 MG/1
4 TABLET, FILM COATED ORAL ONCE
Refills: 0 | Status: COMPLETED | OUTPATIENT
Start: 2021-12-14 | End: 2021-12-14

## 2021-12-14 RX ORDER — LABETALOL HCL 100 MG
20 TABLET ORAL ONCE
Refills: 0 | Status: COMPLETED | OUTPATIENT
Start: 2021-12-14 | End: 2021-12-14

## 2021-12-14 RX ORDER — POTASSIUM CHLORIDE 20 MEQ
40 PACKET (EA) ORAL ONCE
Refills: 0 | Status: COMPLETED | OUTPATIENT
Start: 2021-12-14 | End: 2021-12-14

## 2021-12-14 RX ORDER — HALOPERIDOL DECANOATE 100 MG/ML
2 INJECTION INTRAMUSCULAR ONCE
Refills: 0 | Status: COMPLETED | OUTPATIENT
Start: 2021-12-14 | End: 2021-12-14

## 2021-12-14 RX ORDER — FAMOTIDINE 10 MG/ML
20 INJECTION INTRAVENOUS ONCE
Refills: 0 | Status: COMPLETED | OUTPATIENT
Start: 2021-12-14 | End: 2021-12-14

## 2021-12-14 RX ADMIN — Medication 20 MILLIGRAM(S): at 22:02

## 2021-12-14 RX ADMIN — FAMOTIDINE 20 MILLIGRAM(S): 10 INJECTION INTRAVENOUS at 22:02

## 2021-12-14 RX ADMIN — Medication 40 MILLIEQUIVALENT(S): at 23:08

## 2021-12-14 RX ADMIN — HALOPERIDOL DECANOATE 2 MILLIGRAM(S): 100 INJECTION INTRAMUSCULAR at 23:11

## 2021-12-14 RX ADMIN — Medication 10 MILLIGRAM(S): at 22:02

## 2021-12-14 RX ADMIN — ONDANSETRON 4 MILLIGRAM(S): 8 TABLET, FILM COATED ORAL at 23:07

## 2021-12-14 NOTE — ED PROVIDER NOTE - MUSCULOSKELETAL NEGATIVE STATEMENT, MLM
Patient was mistaken; her mail order pharmacy is now [facility], NOT [facility]. Please send refills to [facility]; patient has one week left of her birth control. Annual appointment scheduled with Sheri Serna on 7-7-20.  Call patient if any questions.  Have you or a member of your household experienced any of the following symptoms?  Fever greater than 100.4? no  New or worsening cough, runny nose, shortness of breath or sore throat? no  Headache, body aches or fatigue? no  Nausea, vomiting or diarrhea? no  New onset of loss of taste or smell? no  Have you or a member of your household had contact with a laboratory confirmed COVID-19 person within the past 14 days? no  Have you or a member of your household traveled outside of [place] within the last 14 days, if yes where? no         no back pain, no gout, no musculoskeletal pain, no neck pain, and no weakness.

## 2021-12-14 NOTE — ED ADULT NURSE NOTE - OBJECTIVE STATEMENT
76y female BIBEMS for nausea, vomiting, dizziness, and hypertension. Pt hypertensive too 200s systolic in triage.

## 2021-12-14 NOTE — ED PROVIDER NOTE - NSFOLLOWUPINSTRUCTIONS_ED_ALL_ED_FT
Hypokalemia    WHAT YOU NEED TO KNOW:    Hypokalemia is a low level of potassium in your blood. Potassium helps control how your muscles, heart, and digestive system work. Hypokalemia occurs when your body loses too much potassium or does not absorb enough from food.     DISCHARGE INSTRUCTIONS:    Seek care immediately if:   •You cannot move your arm or leg.      •You have a fast or irregular heartbeat.      •You are too tired or weak to stand up.      Contact your healthcare provider if:   •You are vomiting, or you have diarrhea.      •You have numbness or tingling in your arms or legs.      •Your symptoms do not go away or they get worse.      •You have questions or concerns about your condition or care.      Medicines:   •Potassium will be given to bring your potassium levels back to normal.      •Take your medicine as directed. Contact your healthcare provider if you think your medicine is not helping or if you have side effects. Tell him of her if you are allergic to any medicine. Keep a list of the medicines, vitamins, and herbs you take. Include the amounts, and when and why you take them. Bring the list or the pill bottles to follow-up visits. Carry your medicine list with you in case of an emergency.      Eat foods that are high in potassium: Foods that are high in potassium include bananas, oranges, tomatoes, potatoes, and avocado. Chambers beans, turkey, salmon, lean beef, yogurt, and milk are also high in potassium. Ask your healthcare provider or dietitian for more information about foods that are high in potassium      Hypertension, Adult      Hypertension is another name for high blood pressure. High blood pressure forces your heart to work harder to pump blood. This can cause problems over time.    There are two numbers in a blood pressure reading. There is a top number (systolic) over a bottom number (diastolic). It is best to have a blood pressure that is below 120/80. Healthy choices can help lower your blood pressure, or you may need medicine to help lower it.      What are the causes?    The cause of this condition is not known. Some conditions may be related to high blood pressure.      What increases the risk?    •Smoking.      •Having type 2 diabetes mellitus, high cholesterol, or both.      •Not getting enough exercise or physical activity.      •Being overweight.      •Having too much fat, sugar, calories, or salt (sodium) in your diet.      •Drinking too much alcohol.      •Having long-term (chronic) kidney disease.      •Having a family history of high blood pressure.      •Age. Risk increases with age.      •Race. You may be at higher risk if you are .      •Gender. Men are at higher risk than women before age 45. After age 65, women are at higher risk than men.      •Having obstructive sleep apnea.      •Stress.        What are the signs or symptoms?  •High blood pressure may not cause symptoms. Very high blood pressure (hypertensive crisis) may cause:  •Headache.      •Feelings of worry or nervousness (anxiety).      •Shortness of breath.      •Nosebleed.      •A feeling of being sick to your stomach (nausea).      •Throwing up (vomiting).      •Changes in how you see.      •Very bad chest pain.      •Seizures.          How is this treated?  •This condition is treated by making healthy lifestyle changes, such as:  •Eating healthy foods.      •Exercising more.      •Drinking less alcohol.      •Your health care provider may prescribe medicine if lifestyle changes are not enough to get your blood pressure under control, and if:  •Your top number is above 130.      •Your bottom number is above 80.        •Your personal target blood pressure may vary.        Follow these instructions at home:      Eating and drinking    •If told, follow the DASH eating plan. To follow this plan:  •Fill one half of your plate at each meal with fruits and vegetables.      •Fill one fourth of your plate at each meal with whole grains. Whole grains include whole-wheat pasta, brown rice, and whole-grain bread.      •Eat or drink low-fat dairy products, such as skim milk or low-fat yogurt.      •Fill one fourth of your plate at each meal with low-fat (lean) proteins. Low-fat proteins include fish, chicken without skin, eggs, beans, and tofu.      •Avoid fatty meat, cured and processed meat, or chicken with skin.      •Avoid pre-made or processed food.        •Eat less than 1,500 mg of salt each day.    • Do not drink alcohol if:  •Your doctor tells you not to drink.      •You are pregnant, may be pregnant, or are planning to become pregnant.      •If you drink alcohol:•Limit how much you use to:  •0–1 drink a day for women.      •0–2 drinks a day for men.        •Be aware of how much alcohol is in your drink. In the [place] one drink equals one 12 oz bottle of beer (355 mL), one 5 oz glass of wine (148 mL), or one 1½ oz glass of hard liquor (44 mL).          Lifestyle      •Work with your doctor to stay at a healthy weight or to lose weight. Ask your doctor what the best weight is for you.      •Get at least 30 minutes of exercise most days of the week. This may include walking, swimming, or biking.      •Get at least 30 minutes of exercise that strengthens your muscles (resistance exercise) at least 3 days a week. This may include lifting weights or doing Pilates.      • Do not use any products that contain nicotine or tobacco, such as cigarettes, e-cigarettes, and chewing tobacco. If you need help quitting, ask your doctor.      •Check your blood pressure at home as told by your doctor.      •Keep all follow-up visits as told by your doctor. This is important.      Medicines     •Take over-the-counter and prescription medicines only as told by your doctor. Follow directions carefully.      • Do not skip doses of blood pressure medicine. The medicine does not work as well if you skip doses. Skipping doses also puts you at risk for problems.      •Ask your doctor about side effects or reactions to medicines that you should watch for.        Contact a doctor if you:    •Think you are having a reaction to the medicine you are taking.      •Have headaches that keep coming back (recurring).      •Feel dizzy.      •Have swelling in your ankles.      •Have trouble with your vision.        Get help right away if you:    •Get a very bad headache.      •Start to feel mixed up (confused).      •Feel weak or numb.      •Feel faint.    •Have very bad pain in your:  •Chest.      •Belly (abdomen).        •Throw up more than once.      •Have trouble breathing.        Summary    •Hypertension is another name for high blood pressure.      •High blood pressure forces your heart to work harder to pump blood.      •For most people, a normal blood pressure is less than 120/80.      •Making healthy choices can help lower blood pressure. If your blood pressure does not get lower with healthy choices, you may need to take medicine. Hypokalemia    WHAT YOU NEED TO KNOW:    Hypokalemia is a low level of potassium in your blood. Potassium helps control how your muscles, heart, and digestive system work. Hypokalemia occurs when your body loses too much potassium or does not absorb enough from food.     DISCHARGE INSTRUCTIONS:    Seek care immediately if:   •You cannot move your arm or leg.      •You have a fast or irregular heartbeat.      •You are too tired or weak to stand up.      Contact your healthcare provider if:   •You are vomiting, or you have diarrhea.      •You have numbness or tingling in your arms or legs.      •Your symptoms do not go away or they get worse.      •You have questions or concerns about your condition or care.      Medicines:   •Potassium will be given to bring your potassium levels back to normal.      •Take your medicine as directed. Contact your healthcare provider if you think your medicine is not helping or if you have side effects. Tell him of her if you are allergic to any medicine. Keep a list of the medicines, vitamins, and herbs you take. Include the amounts, and when and why you take them. Bring the list or the pill bottles to follow-up visits. Carry your medicine list with you in case of an emergency.      Eat foods that are high in potassium: Foods that are high in potassium include bananas, oranges, tomatoes, potatoes, and avocado. Chambers beans, turkey, salmon, lean beef, yogurt, and milk are also high in potassium. Ask your healthcare provider or dietitian for more information about foods that are high in potassium      Hypertension, Adult      Hypertension is another name for high blood pressure. High blood pressure forces your heart to work harder to pump blood. This can cause problems over time.    There are two numbers in a blood pressure reading. There is a top number (systolic) over a bottom number (diastolic). It is best to have a blood pressure that is below 120/80. Healthy choices can help lower your blood pressure, or you may need medicine to help lower it.      What are the causes?    The cause of this condition is not known. Some conditions may be related to high blood pressure.      What increases the risk?    •Smoking.      •Having type 2 diabetes mellitus, high cholesterol, or both.      •Not getting enough exercise or physical activity.      •Being overweight.      •Having too much fat, sugar, calories, or salt (sodium) in your diet.      •Drinking too much alcohol.      •Having long-term (chronic) kidney disease.      •Having a family history of high blood pressure.      •Age. Risk increases with age.      •Race. You may be at higher risk if you are .      •Gender. Men are at higher risk than women before age 45. After age 65, women are at higher risk than men.      •Having obstructive sleep apnea.      •Stress.        What are the signs or symptoms?  •High blood pressure may not cause symptoms. Very high blood pressure (hypertensive crisis) may cause:  •Headache.      •Feelings of worry or nervousness (anxiety).      •Shortness of breath.      •Nosebleed.      •A feeling of being sick to your stomach (nausea).      •Throwing up (vomiting).      •Changes in how you see.      •Very bad chest pain.      •Seizures.          How is this treated?  •This condition is treated by making healthy lifestyle changes, such as:  •Eating healthy foods.      •Exercising more.      •Drinking less alcohol.      •Your health care provider may prescribe medicine if lifestyle changes are not enough to get your blood pressure under control, and if:  •Your top number is above 130.      •Your bottom number is above 80.        •Your personal target blood pressure may vary.        Follow these instructions at home:      Eating and drinking    •If told, follow the DASH eating plan. To follow this plan:  •Fill one half of your plate at each meal with fruits and vegetables.      •Fill one fourth of your plate at each meal with whole grains. Whole grains include whole-wheat pasta, brown rice, and whole-grain bread.      •Eat or drink low-fat dairy products, such as skim milk or low-fat yogurt.      •Fill one fourth of your plate at each meal with low-fat (lean) proteins. Low-fat proteins include fish, chicken without skin, eggs, beans, and tofu.      •Avoid fatty meat, cured and processed meat, or chicken with skin.      •Avoid pre-made or processed food.        •Eat less than 1,500 mg of salt each day.    • Do not drink alcohol if:  •Your doctor tells you not to drink.      •You are pregnant, may be pregnant, or are planning to become pregnant.      •If you drink alcohol:•Limit how much you use to:  •0–1 drink a day for women.      •0–2 drinks a day for men.        •Be aware of how much alcohol is in your drink. In the [place] one drink equals one 12 oz bottle of beer (355 mL), one 5 oz glass of wine (148 mL), or one 1½ oz glass of hard liquor (44 mL).          Lifestyle      •Work with your doctor to stay at a healthy weight or to lose weight. Ask your doctor what the best weight is for you.      •Get at least 30 minutes of exercise most days of the week. This may include walking, swimming, or biking.      •Get at least 30 minutes of exercise that strengthens your muscles (resistance exercise) at least 3 days a week. This may include lifting weights or doing Pilates.      • Do not use any products that contain nicotine or tobacco, such as cigarettes, e-cigarettes, and chewing tobacco. If you need help quitting, ask your doctor.      •Check your blood pressure at home as told by your doctor.      •Keep all follow-up visits as told by your doctor. This is important.      Medicines     •Take over-the-counter and prescription medicines only as told by your doctor. Follow directions carefully.      • Do not skip doses of blood pressure medicine. The medicine does not work as well if you skip doses. Skipping doses also puts you at risk for problems.      •Ask your doctor about side effects or reactions to medicines that you should watch for.        Contact a doctor if you:    •Think you are having a reaction to the medicine you are taking.      •Have headaches that keep coming back (recurring).      •Feel dizzy.      •Have swelling in your ankles.      •Have trouble with your vision.        Get help right away if you:    •Get a very bad headache.      •Start to feel mixed up (confused).      •Feel weak or numb.      •Feel faint.    •Have very bad pain in your:  •Chest.      •Belly (abdomen).        •Throw up more than once.      •Have trouble breathing.        Summary    •Hypertension is another name for high blood pressure.      •High blood pressure forces your heart to work harder to pump blood.      •For most people, a normal blood pressure is less than 120/80.      •Making healthy choices can help lower blood pressure. If your blood pressure does not get lower with healthy choices, you may need to take medicine.      Thyroid Nodule       A thyroid nodule is an isolated growth of thyroid cells that forms a lump in your thyroid gland. The thyroid gland is a butterfly-shaped gland. It is found in the lower front of your neck. This gland sends chemical messengers (hormones) through your blood to all parts of your body. These hormones are important in regulating your body temperature and helping your body to use energy.    Thyroid nodules are common. Most are not cancerous (benign). You may have one nodule or several nodules.  Different types of thyroid nodules include nodules that:  •Grow and fill with fluid (thyroid cysts).      •Produce too much thyroid hormone (hot nodules or hyperthyroid).      •Produce no thyroid hormone (cold nodules or hypothyroid).      •Form from cancer cells (thyroid cancers).        What are the causes?    In most cases, the cause of this condition is not known.      What increases the risk?  The following factors may make you more likely to develop this condition.  •Age. Thyroid nodules become more common in people who are older than 45 years of age.    •Gender.  •Benign thyroid nodules are more common in women.      •Cancerous (malignant) thyroid nodules are more common in men.      •A family history that includes:  •Thyroid nodules.      •Pheochromocytoma.      •Thyroid carcinoma.      •Hyperparathyroidism.        •Certain kinds of thyroid diseases, such as Hashimoto's thyroiditis.      •Lack of iodine in your diet.      •A history of head and neck radiation, such as from previous cancer treatment.        What are the signs or symptoms?  In many cases, there are no symptoms. If you have symptoms, they may include:  •A lump in your lower neck.      •Feeling a lump or tickle in your throat.      •Pain in your neck, jaw, or ear.      •Having trouble swallowing.    Hot nodules may cause symptoms that include:  •Weight loss.      •Warm, flushed skin.      •Feeling hot.      •Feeling nervous.      •A racing heartbeat.    Cold nodules may cause symptoms that include:  •Weight gain.      •Dry skin.      •Brittle hair. This may also occur with hair loss.      •Feeling cold.      •Fatigue.    Thyroid cancer nodules may cause symptoms that include:  •Hard nodules that feel stuck to the thyroid gland.      •Hoarseness.      •Lumps in the glands near your thyroid (lymph nodes).        How is this diagnosed?  A thyroid nodule may be felt by your health care provider during a physical exam. This condition may also be diagnosed based on your symptoms. You may also have tests, including:  •An ultrasound. This may be done to confirm the diagnosis.      •A biopsy. This involves taking a sample from the nodule and looking at it under a microscope.      •Blood tests to make sure that your thyroid is working properly.      •A thyroid scan. This test uses a radioactive tracer injected into a vein to create an image of the thyroid gland on a computer screen.    •Imaging tests such as MRI or CT scan. These may be done if:  •Your nodule is large.      •Your nodule is blocking your airway.      •Cancer is suspected.          How is this treated?  Treatment depends on the cause and size of your nodule or nodules. If the nodule is benign, treatment may not be necessary. Your health care provider may monitor the nodule to see if it goes away without treatment. If the nodule continues to grow, is cancerous, or does not go away, treatment may be needed. Treatment may include:  •Having a cystic nodule drained with a needle.      •Ablation therapy. In this treatment, alcohol is injected into the area of the nodule to destroy the cells. Ablation with heat (thermal ablation) may also be used.      •Radioactive iodine. In this treatment, radioactive iodine is given as a pill or liquid that you drink. This substance causes the thyroid nodule to shrink.      •Surgery to remove the nodule. Part or all of your thyroid gland may need to be removed as well.      •Medicines.        Follow these instructions at home:    •Pay attention to any changes in your nodule.      •Take over-the-counter and prescription medicines only as told by your health care provider.      •Keep all follow-up visits as told by your health care provider. This is important.        Contact a health care provider if:    •Your voice changes.      •You have trouble swallowing.      •You have pain in your neck, ear, or jaw that is getting worse.      •Your nodule gets bigger.      •Your nodule starts to make it harder for you to breathe.      •Your muscles look like they are shrinking (muscle wasting).        Get help right away if:    •You have chest pain.      •There is a loss of consciousness.      •You have a sudden fever.      •You feel confused.      •You are seeing or hearing things that other people do not see or hear (having hallucinations).      •You feel very weak.      •You have mood swings.      •You feel very restless.      •You feel suddenly nauseous or throw up.      •You suddenly have diarrhea.        Summary    •A thyroid nodule is an isolated growth of thyroid cells that forms a lump in your thyroid gland.      •Thyroid nodules are common. Most are not cancerous (benign). You may have one nodule or several nodules.      •Treatment depends on the cause and size of your nodule or nodules. If the nodule is benign, treatment may not be necessary.      •Your health care provider may monitor the nodule to see if it goes away without treatment. If the nodule continues to grow, is cancerous, or does not go away, treatment may be needed

## 2021-12-14 NOTE — ED PROVIDER NOTE - CARE PROVIDER_API CALL
Osvaldo Coles)  Cardiovascular Disease  [address] [facility] [place] [address]  Phone: [phone]  Fax: [phone]  Follow Up Time:     Yudelka Vogel)  Internal Medicine  [address] [address]  Phone: [phone]  Fax: [phone]  Follow Up Time:     your cardiologist/PMD,   Phone: (   )    -  Fax: (   )    -  Follow Up Time:

## 2021-12-14 NOTE — ED PROVIDER NOTE - PROVIDER TOKENS
PROVIDER:[TOKEN:[[id number]]],PROVIDER:[TOKEN:[[id number]]],FREE:[LAST:[your cardiologist/PMD],PHONE:[(   )    -],FAX:[(   )    -]]

## 2021-12-14 NOTE — ED ADULT NURSE NOTE - CAS ELECT INFOMATION PROVIDED
Pt escorted to front by RN, awaiting 0900 arrival of arranged transport. Alert and ambulating with steady gait at discharge./DC instructions

## 2021-12-14 NOTE — ED PROVIDER NOTE - CARE PLAN
1 Principal Discharge DX:	Hypertensive urgency  Secondary Diagnosis:	Nausea and vomiting  Secondary Diagnosis:	Hypokalemia   Principal Discharge DX:	Hypertensive urgency  Secondary Diagnosis:	Nausea and vomiting  Secondary Diagnosis:	Hypokalemia  Secondary Diagnosis:	Multinodular thyroid

## 2021-12-14 NOTE — ED ADULT NURSE NOTE - TEMPLATE LIST FOR HEAD TO TOE ASSESSMENT
Start Zyrtec daily for allergies  Try nasal saline sprays and a humidifier at night for nose dryness and irritation.  This should help prevent nosebleeds    .Please obtain labs after fasting (except water) for at least 8 hrs.  I recommend going to the lab first thing in the morning after sleeping, since it's easy to fast overnight!  You can make an appointment at our lab here in clinic for Saturday from 8 to 10:20 AM- make an appointment on your way out at the 's desk.  You can also go to one of the other labs listed on the lab sheet we gave you.  I will call with any abnormal results.    .Call us to schedule a Saturday morning lab appointment in our clinic.  Alternatively, you may go to [facility] in [place] on [facility], first floor, check in at [facility].  Parking across the street in [facility].  They are open M-F 8AM-6PM and Saturday 8AM-11:3-AM     The EKG should also be done at [facility]      .Eat clean, whole foods (fresh vegetables and fruits, whole grains, dairy)  Less high sugar and salt foods (candy, baked goods, chips, hot cheetos, packaged or fast foods)  Drink more water and no or very little soda or juice  Cook at home and eat dinner as a family as often as possible  Physical Activity: at least 30 minutes of moderate intensity activity at least 3-4 days per week (brisk walking, running, playing outside, playing sports, swimming, etc)  Come back for a check up in 2-3 months so we can talk about how you are doing with your goals!  
VIEW ALL

## 2021-12-14 NOTE — ED PROVIDER NOTE - CLINICAL SUMMARY MEDICAL DECISION MAKING FREE TEXT BOX
pt p/w sudden onset of N/V with lightheadedness at home tonight. Reports similar sx in the past two wks but self resolved.  Exam with no focal neuro deficits, EKG with no ischemic findings, labs significant for K of 2.8 likely 2/2 N/V and GI loss, s/p IV and oral repletion, CTH/CTA with no acute findings, sx improved s/p IV antiemetics and BP control, sx may be 2/2 HTN urgency, pt with suboptimally controlled BP requiring IV antihypertensive and also home oral meds (couldn't tolerate at home due to N/V), AFVSS at time of d/c, pt non-toxic appearing, results, ddx, and f/u plans discussed with pt at bedside, d/c'd home to f/u with cardiology and neuro, strict return precautions discussed, prompt return to ER for any worsening or new sx, pt verbalized understanding. pt p/w sudden onset of N/V with lightheadedness at home tonight. Reports similar sx in the past two wks but self resolved.  Exam with no focal neuro deficits, EKG with no ischemic findings, labs significant for K of 2.8 likely 2/2 N/V and GI loss, s/p IV and oral repletion, CTH/CTA with no acute findings, sx improved s/p IV antiemetics and BP control, sx may be 2/2 HTN urgency, pt with suboptimally controlled BP requiring IV antihypertensive and also home oral meds (couldn't tolerate at home due to N/V), also noted incidental findings of multinodular thyroid on CTA, AFVSS at time of d/c, pt non-toxic appearing, results, ddx, and f/u plans discussed with pt at bedside, d/c'd home to f/u with cardiology/neuro/pmd, strict return precautions discussed, prompt return to ER for any worsening or new sx, pt verbalized understanding. pt p/w sudden onset of N/V with lightheadedness at home tonight. Reports similar sx in the past two wks but self resolved. Sx worse with head movements and feels consistent with her BPV.  Exam with no focal neuro deficits, sx suggestive of peripheral vertigo, EKG with no ischemic findings, labs significant for K of 2.8 likely 2/2 N/V and GI loss, s/p IV and oral repletion, CTH/CTA with no acute findings, sx improved s/p IV antiemetics and BP control, sx may be in addition to HTN urgency as well, pt with suboptimally controlled BP requiring IV antihypertensive and also home oral meds (couldn't tolerate at home due to N/V), also noted incidental findings of multinodular thyroid on CTA, AFVSS at time of d/c, pt non-toxic appearing, results, ddx, and f/u plans discussed with pt at bedside, d/c'd home to f/u with cardiology/neuro/pmd, strict return precautions discussed, prompt return to ER for any worsening or new sx, pt verbalized understanding.

## 2021-12-14 NOTE — ED ADULT TRIAGE NOTE - CHIEF COMPLAINT QUOTE
Pt BIBA c/o N/V/D and dizziness for the past 2 hours. PMH of HTN and DM. Pt is very hypertensive in triage.

## 2021-12-14 NOTE — ED PROVIDER NOTE - OBJECTIVE STATEMENT
76 yo F with PMHx of NIDDM, HTN, on atenolol, isosorbide, cardizem, BPV, BIBA for N/V and dizziness since this evening.  Pt reports talking on the phone this evening and noted feeling nauseated and lightheadedness.  Had 2 episodes of NBNB emesis and now feeling persistently dizzy.  Notes similar episodes 2 yrs ago and had full inpt w/u for TIA/CVA with negative findings.  Endorses few episodes in the past 2 wks with similar sx and went away after few mins without intervention.  Denies HA, vertigo, imbalance, numbness, tingling, photophobia, diplopia, change in vision/hearing/gait/mental status/speech, focal weakness, neck pain, rash, fever, chills, stiffness, CP, SOB, palpitations, diaphoresis, D/C, abdominal pain, change in urinary/bowel function, dysuria, hematuria, flank pain, and malaise. Pt is s/p 2 doses of moderna vaccine.  Had routine annual physical with PMD today as well 76 yo F with PMHx of NIDDM, HTN, on atenolol, isosorbide, cardizem, BPV, BIBA for N/V and dizziness since this evening.  Pt reports talking on the phone this evening and noted feeling nauseated and lightheadedness.  Had 2 episodes of NBNB emesis and now feeling persistently dizzy. Sx are worse with head movements and ambulation. Notes similar episodes 2 yrs ago and had full inpt w/u for TIA/CVA with negative findings.  Endorses few episodes in the past 2 wks with similar sx and went away after few mins without intervention.  Denies HA, vertigo, imbalance, numbness, tingling, photophobia, diplopia, change in vision/hearing/gait/mental status/speech, focal weakness, neck pain, rash, fever, chills, stiffness, CP, SOB, palpitations, diaphoresis, D/C, abdominal pain, change in urinary/bowel function, dysuria, hematuria, flank pain, and malaise. Pt is s/p 2 doses of moderna vaccine.  Had routine annual physical with PMD today as well and was told that BP has been elevated but no changes in home meds so far.

## 2021-12-14 NOTE — ED PROVIDER NOTE - PROGRESS NOTE DETAILS
pt reports improvement in sx s/p IV antiemetics and staying still in bed, attempted esperanza hallpike maneuver with moderate improvement as well.  Initially refused CTH/CTA due to improvement in sx and prior w/u with negative finding.  However, due to markedly elevated bp and sudden onset of sx, need to r/o dissection vs posterior circulation stroke, shared medical decision performed at bedside and pt is currently considering pt amendable to CT with "some medication to help calm me down and nausea" - low dose haldol given for antiemetics and sedation purposes sx resolved, pt desires to go home, however, still with markedly elevated bp, likely 2/2 inability to tolerate po earlier with vomiting of all home meds.  Will give home meds and po trial, transportation arranged

## 2021-12-14 NOTE — ED PROVIDER NOTE - PHYSICAL EXAMINATION
Vital Signs - nursing notes reviewed and confirmed  Gen - WDWN F, NAD, comfortable and non-toxic appearing, speaking in full sentences   Skin - warm, dry, intact  HEENT - AT/NC, PERRL, EOMI, no conjunctival injection, moist oral mucosa, TM intact b/l with good cone of lights and no fluids noted, no mastoid tenderness b/l, o/p clear with no erythema, edema, or exudate, uvula midline, airway patent, neck supple and NT, FROM, no JVD or carotid bruits b/l, no palpable nodes  CV - S1S2, R/R/R  Resp - respiration non-labored, CTAB, symmetric bs b/l, no r/r/w  GI - NABS, soft, ND, NT, no rebound or guarding, no CVAT b/l   MS - w/w/p, no c/c/e, calves supple and NT, distal pulses symmetric b/l, brisk cap refills, +SILT, NV intact, FROM, compartment soft  Neuro - AxOx3, no focal neuro deficits, CN II-XII grossly intact, cerebellar function intact, negative pronator drift, negative nystagmus, ambulatory with assistance Vital Signs - nursing notes reviewed and confirmed  Gen - WDWN F, NAD, speaking in full sentences   Skin - warm, dry, intact  HEENT - AT/NC, PERRL, EOMI, no conjunctival injection, moist oral mucosa, TM intact b/l with good cone of lights and no fluids noted, no mastoid tenderness b/l, o/p clear with no erythema, edema, or exudate, uvula midline, airway patent, neck supple and NT, FROM, no JVD or carotid bruits b/l, no palpable nodes  CV - S1S2, R/R/R  Resp - respiration non-labored, CTAB, symmetric bs b/l, no r/r/w  GI - NABS, soft, ND, NT, no rebound or guarding, no CVAT b/l   MS - w/w/p, no c/c/e, calves supple and NT, distal pulses symmetric b/l, brisk cap refills, +SILT, NV intact, FROM, compartment soft  Neuro - AxOx3, no focal neuro deficits, CN II-XII grossly intact, cerebellar function intact, negative pronator drift, negative nystagmus, ambulatory with assistance, strength 5/5 BUE/BLE

## 2021-12-15 VITALS
TEMPERATURE: 98 F | SYSTOLIC BLOOD PRESSURE: 182 MMHG | RESPIRATION RATE: 18 BRPM | OXYGEN SATURATION: 95 % | DIASTOLIC BLOOD PRESSURE: 47 MMHG | HEART RATE: 63 BPM

## 2021-12-15 RX ORDER — ATENOLOL 25 MG/1
50 TABLET ORAL ONCE
Refills: 0 | Status: COMPLETED | OUTPATIENT
Start: 2021-12-15 | End: 2021-12-15

## 2021-12-15 RX ORDER — METOCLOPRAMIDE HCL 10 MG
1 TABLET ORAL
Qty: 15 | Refills: 0
Start: 2021-12-15 | End: 2022-01-13

## 2021-12-15 RX ORDER — DILTIAZEM HCL 120 MG
1 CAPSULE, EXT RELEASE 24 HR ORAL
Qty: 0 | Refills: 0 | DISCHARGE

## 2021-12-15 RX ORDER — POTASSIUM CHLORIDE 20 MEQ
40 PACKET (EA) ORAL ONCE
Refills: 0 | Status: COMPLETED | OUTPATIENT
Start: 2021-12-15 | End: 2021-12-15

## 2021-12-15 RX ORDER — HYDRALAZINE HCL 50 MG
10 TABLET ORAL ONCE
Refills: 0 | Status: COMPLETED | OUTPATIENT
Start: 2021-12-15 | End: 2021-12-15

## 2021-12-15 RX ORDER — DILTIAZEM HCL 120 MG
10 CAPSULE, EXT RELEASE 24 HR ORAL ONCE
Refills: 0 | Status: COMPLETED | OUTPATIENT
Start: 2021-12-15 | End: 2021-12-15

## 2021-12-15 RX ORDER — ONDANSETRON 8 MG/1
1 TABLET, FILM COATED ORAL
Qty: 10 | Refills: 0
Start: 2021-12-15

## 2021-12-15 RX ORDER — ATENOLOL 25 MG/1
1 TABLET ORAL
Qty: 0 | Refills: 0 | DISCHARGE

## 2021-12-15 RX ADMIN — Medication 10 MILLIGRAM(S): at 02:28

## 2021-12-15 RX ADMIN — DEXTROSE MONOHYDRATE, SODIUM CHLORIDE, AND POTASSIUM CHLORIDE 500 MILLILITER(S): 50; .745; 4.5 INJECTION, SOLUTION INTRAVENOUS at 01:22

## 2021-12-15 RX ADMIN — Medication 40 MILLIEQUIVALENT(S): at 02:28

## 2022-11-22 NOTE — DISCHARGE NOTE PROVIDER - NSDCFUADDAPPT_GEN_ALL_CORE_FT
Please follow up with your cardiologist WITHIN 1-2 days for a blood pressure check up!!
[Negative] : Heme/Lymph

## 2023-04-07 ENCOUNTER — NON-APPOINTMENT (OUTPATIENT)
Age: 78
End: 2023-04-07

## 2023-10-23 NOTE — DATA REVIEWED
[de-identified] : Complete audiometry was ordered and completed today. This was separately reported by the audiologist. The results were reviewed in detail with the patient.\par \par Mixed hearing loss in the right ear noted. Bilateral sensory neural loss.
I do not need any legal help

## 2024-05-03 NOTE — ED ADULT TRIAGE NOTE - CHIEF COMPLAINT QUOTE
Patient c/o nausea and weakness since 230pm today while on the train. She states "The train was so hot it made me nauseous."
Negative Screen

## 2024-07-12 ENCOUNTER — APPOINTMENT (OUTPATIENT)
Dept: INTERNAL MEDICINE | Facility: CLINIC | Age: 79
End: 2024-07-12

## 2025-04-08 NOTE — ED ADULT NURSE NOTE - NS ED NURSE LEVEL OF CONSCIOUSNESS MENTAL STATUS
Consulted for BLE venous/sun damage. Pt has been living in car for some time and has been applying an antibiotic cream to legs.     RLE scattered open areas, skin dry/flaky, no drainage/odor. Recommend wound cleanser, Aquaphor, xeroform to open areas, ABD, rolled gauze every other day&PRN.        L dorsal foot scattered open areas, dry/flaky skin, no drainage/odor. Recommend wound cleanser, Aquaphor, xeroform to open areas, ABD, rolled gauze every other day&PRN.    
Sleepy